# Patient Record
Sex: FEMALE | Race: WHITE | NOT HISPANIC OR LATINO | Employment: FULL TIME | URBAN - METROPOLITAN AREA
[De-identification: names, ages, dates, MRNs, and addresses within clinical notes are randomized per-mention and may not be internally consistent; named-entity substitution may affect disease eponyms.]

---

## 2020-07-02 ENCOUNTER — OFFICE VISIT (OUTPATIENT)
Dept: URGENT CARE | Facility: CLINIC | Age: 60
End: 2020-07-02
Payer: COMMERCIAL

## 2020-07-02 VITALS
SYSTOLIC BLOOD PRESSURE: 128 MMHG | HEIGHT: 66 IN | RESPIRATION RATE: 14 BRPM | WEIGHT: 214 LBS | BODY MASS INDEX: 34.39 KG/M2 | HEART RATE: 90 BPM | DIASTOLIC BLOOD PRESSURE: 80 MMHG | OXYGEN SATURATION: 95 % | TEMPERATURE: 98.5 F

## 2020-07-02 DIAGNOSIS — R05.8 DRY COUGH: ICD-10-CM

## 2020-07-02 DIAGNOSIS — Z88.9 HISTORY OF SEASONAL ALLERGIES: ICD-10-CM

## 2020-07-02 DIAGNOSIS — J30.9 ALLERGIC RHINITIS, UNSPECIFIED SEASONALITY, UNSPECIFIED TRIGGER: ICD-10-CM

## 2020-07-02 PROCEDURE — 99204 OFFICE O/P NEW MOD 45 MIN: CPT | Performed by: PHYSICIAN ASSISTANT

## 2020-07-02 RX ORDER — FLUTICASONE PROPIONATE 50 MCG
1 SPRAY, SUSPENSION (ML) NASAL DAILY
Qty: 16 G | Refills: 0 | Status: SHIPPED | OUTPATIENT
Start: 2020-07-02 | End: 2021-05-21

## 2020-07-02 RX ORDER — METHYLPREDNISOLONE 4 MG/1
TABLET ORAL
Qty: 1 PACKAGE | Refills: 0 | Status: SHIPPED | OUTPATIENT
Start: 2020-07-02 | End: 2020-12-28

## 2020-07-02 ASSESSMENT — ENCOUNTER SYMPTOMS
SHORTNESS OF BREATH: 0
RHINORRHEA: 1
DIARRHEA: 0
VOMITING: 0
SINUS PAIN: 0
EYE REDNESS: 1
SPUTUM PRODUCTION: 0
COUGH: 1
CHILLS: 0
ABDOMINAL PAIN: 0
SORE THROAT: 1
FEVER: 0
EYE DISCHARGE: 0
WHEEZING: 1
MYALGIAS: 0

## 2020-07-02 NOTE — PROGRESS NOTES
"Subjective:      Trey Fisher is a 59 y.o. female who presents with Cough (cough, sob x 10 days - states possible allergies, just moved to [place] )            Patient is a 59-year-old female who presents to urgent care with itchy eyes, sneezing, congestion, scratchy throat in the mornings and a slight dry cough for the last 9 to 10 days.  She does report episodes of \"coughing fits \"of which during that time she does feel slightly short of breath with questionable wheezing.  She does report long history of seasonal allergies in particular during springtime.  She reports recently moving to [place] during this time of which she feels that this is allergy related.  She does report mild improvement with Allegra however it does not appear to be helping as well as before. She denies \"feeling bad\" or ill at this time, fevers, bodyaches, GI changes. She also denies any ill contacts as well.       Cough   This is a new problem. The current episode started 1 to 4 weeks ago. The problem has been unchanged. The problem occurs every few minutes. The cough is non-productive. Associated symptoms include eye redness, nasal congestion, postnasal drip, rhinorrhea, a sore throat and wheezing. Pertinent negatives include no chills, fever, myalgias, rash or shortness of breath. The symptoms are aggravated by pollens (Outside work. ). Treatments tried: As above.  Her past medical history is significant for bronchitis and environmental allergies. There is no history of asthma or pneumonia.     Of note patient does report hx of stage I Leukemia- this is monitored with blood work every 3 months- she is currently not on any tx. For such. She otherwise reports she is healthy.           Review of Systems   Constitutional: Negative for chills and fever.   HENT: Positive for congestion, postnasal drip, rhinorrhea and sore throat. Negative for sinus pain.    Eyes: Positive for redness. Negative for discharge.   Respiratory: Positive for cough " "and wheezing. Negative for sputum production and shortness of breath.    Gastrointestinal: Negative for abdominal pain, diarrhea and vomiting.   Musculoskeletal: Negative for myalgias.   Skin: Negative for itching and rash.   Endo/Heme/Allergies: Positive for environmental allergies.   All other systems reviewed and are negative.         Objective:     /80 (BP Location: Left arm, Patient Position: Sitting, BP Cuff Size: Adult)   Pulse 90   Temp 36.9 °C (98.5 °F) (Temporal)   Resp 14   Ht 1.676 m (5' 6\")   Wt 97.1 kg (214 lb)   SpO2 95%   BMI 34.54 kg/m²    PMH:HC of Stage 1 leukemia.   MEDS: Reviewed .   ALLERGIES: No Known Allergies  SURGHX: History reviewed. No pertinent surgical history.  SOCHX:  reports that she has never smoked. She has never used smokeless tobacco.  FH: Family history was reviewed, no pertinent findings to report    Physical Exam  Vitals signs reviewed.   Constitutional:       General: She is not in acute distress.     Appearance: She is well-developed.   HENT:      Head: Normocephalic and atraumatic.      Right Ear: External ear normal.      Left Ear: External ear normal.      Mouth/Throat:      Pharynx: No oropharyngeal exudate.      Comments: Pos for PND.     Eyes:      Pupils: Pupils are equal, round, and reactive to light.      Comments: Mild conjunctival injection.    Neck:      Musculoskeletal: Normal range of motion and neck supple.      Trachea: No tracheal deviation.   Cardiovascular:      Rate and Rhythm: Normal rate and regular rhythm.      Heart sounds: No murmur.   Pulmonary:      Effort: Pulmonary effort is normal. No respiratory distress.      Breath sounds: Normal breath sounds.   Musculoskeletal: Normal range of motion.   Skin:     General: Skin is warm.      Findings: No rash.   Neurological:      Mental Status: She is alert and oriented to person, place, and time.      Coordination: Coordination normal.   Psychiatric:         Behavior: Behavior normal.         " Thought Content: Thought content normal.         Judgment: Judgment normal.                 Assessment/Plan:       1. Allergic rhinitis, unspecified seasonality, unspecified trigger  - fluticasone (FLONASE) 50 MCG/ACT nasal spray; Spray 1 Spray in nose every day.  Dispense: 16 g; Refill: 0  - methylPREDNISolone (MEDROL DOSEPAK) 4 MG Tablet Therapy Pack; UAD  Dispense: 1 Package; Refill: 0    2. History of seasonal allergies  - fluticasone (FLONASE) 50 MCG/ACT nasal spray; Spray 1 Spray in nose every day.  Dispense: 16 g; Refill: 0  - methylPREDNISolone (MEDROL DOSEPAK) 4 MG Tablet Therapy Pack; UAD  Dispense: 1 Package; Refill: 0    3. Dry cough  - methylPREDNISolone (MEDROL DOSEPAK) 4 MG Tablet Therapy Pack; UAD  Dispense: 1 Package; Refill: 0    Pt. Is to continue with the Allegra at this time- she is to take a night time Benadryl if needed.   Will start the patient in the above- she is to increase her fluid in take and avoid triggers.   Also avoid night time dairy.   Patient and/or guardian given precautionary s/sx that mandate immediate follow up and evaluation in the ED. Advised of risks of not doing so.  Side effects of the above medications discussed.   Pt without s/s of infection- however discussed symptoms that would prompt COVID testing at this time.     DDX, Supportive care, and indications for immediate follow-up discussed with patient.    Instructed to return to clinic or nearest emergency department if we are not available for any change in condition, further concerns, or worsening of symptoms.    The patient and/or guardian demonstrated a good understanding and agreed with the treatment plan.    Please note that this dictation was created using voice recognition software. I have made every reasonable attempt to correct obvious errors, but I expect that there are errors of grammar and possibly content that I did not discover before finalizing the note.

## 2020-07-15 ENCOUNTER — TELEMEDICINE (OUTPATIENT)
Dept: TELEHEALTH | Facility: TELEMEDICINE | Age: 60
End: 2020-07-15
Payer: COMMERCIAL

## 2020-07-15 DIAGNOSIS — J01.90 ACUTE BACTERIAL SINUSITIS: ICD-10-CM

## 2020-07-15 DIAGNOSIS — B96.89 ACUTE BACTERIAL SINUSITIS: ICD-10-CM

## 2020-07-15 DIAGNOSIS — R50.9 FEVER, UNSPECIFIED FEVER CAUSE: ICD-10-CM

## 2020-07-15 PROCEDURE — 99204 OFFICE O/P NEW MOD 45 MIN: CPT | Mod: 95,CR,CS | Performed by: NURSE PRACTITIONER

## 2020-07-15 RX ORDER — AMOXICILLIN AND CLAVULANATE POTASSIUM 875; 125 MG/1; MG/1
1 TABLET, FILM COATED ORAL 2 TIMES DAILY
Qty: 14 TAB | Refills: 0 | Status: SHIPPED | OUTPATIENT
Start: 2020-07-15 | End: 2020-07-22

## 2020-07-15 ASSESSMENT — ENCOUNTER SYMPTOMS
SWOLLEN GLANDS: 0
SINUS PRESSURE: 1
FEVER: 1
MEMORY LOSS: 0
MYALGIAS: 0
WHEEZING: 0
EYE DISCHARGE: 0
BACK PAIN: 0
WEAKNESS: 0
SINUS PAIN: 1
NAUSEA: 0
CONSTIPATION: 0
PALPITATIONS: 0
ORTHOPNEA: 0
HEARTBURN: 0
EYE PAIN: 0
DIARRHEA: 0
SORE THROAT: 1
DIAPHORESIS: 0
COUGH: 1
VOMITING: 0
NECK PAIN: 0
HEADACHES: 1
HEMOPTYSIS: 0
SHORTNESS OF BREATH: 0
TINGLING: 0
CHILLS: 0
DIZZINESS: 0

## 2020-07-21 ENCOUNTER — TELEPHONE (OUTPATIENT)
Dept: SCHEDULING | Facility: IMAGING CENTER | Age: 60
End: 2020-07-21

## 2020-07-28 ENCOUNTER — NURSE TRIAGE (OUTPATIENT)
Dept: HEALTH INFORMATION MANAGEMENT | Facility: OTHER | Age: 60
End: 2020-07-28

## 2020-07-28 ENCOUNTER — APPOINTMENT (OUTPATIENT)
Dept: MEDICAL GROUP | Facility: PHYSICIAN GROUP | Age: 60
End: 2020-07-28

## 2020-07-28 ENCOUNTER — HOSPITAL ENCOUNTER (OUTPATIENT)
Dept: LAB | Facility: MEDICAL CENTER | Age: 60
End: 2020-07-28
Attending: NURSE PRACTITIONER
Payer: COMMERCIAL

## 2020-07-28 DIAGNOSIS — R50.9 FEVER, UNSPECIFIED FEVER CAUSE: ICD-10-CM

## 2020-07-28 LAB — COVID ORDER STATUS COVID19: NORMAL

## 2020-07-28 PROCEDURE — C9803 HOPD COVID-19 SPEC COLLECT: HCPCS

## 2020-07-28 PROCEDURE — U0003 INFECTIOUS AGENT DETECTION BY NUCLEIC ACID (DNA OR RNA); SEVERE ACUTE RESPIRATORY SYNDROME CORONAVIRUS 2 (SARS-COV-2) (CORONAVIRUS DISEASE [COVID-19]), AMPLIFIED PROBE TECHNIQUE, MAKING USE OF HIGH THROUGHPUT TECHNOLOGIES AS DESCRIBED BY CMS-2020-01-R: HCPCS

## 2020-07-28 SDOH — ECONOMIC STABILITY: HOUSING INSECURITY: IN THE LAST 12 MONTHS, HOW MANY PLACES HAVE YOU LIVED?: 1

## 2020-07-28 SDOH — ECONOMIC STABILITY: HOUSING INSECURITY
IN THE LAST 12 MONTHS, WAS THERE A TIME WHEN YOU DID NOT HAVE A STEADY PLACE TO SLEEP OR SLEPT IN A SHELTER (INCLUDING NOW)?: NO

## 2020-07-28 SDOH — ECONOMIC STABILITY: TRANSPORTATION INSECURITY
IN THE PAST 12 MONTHS, HAS LACK OF RELIABLE TRANSPORTATION KEPT YOU FROM MEDICAL APPOINTMENTS, MEETINGS, WORK OR FROM GETTING THINGS NEEDED FOR DAILY LIVING?: DECLINE

## 2020-07-28 SDOH — HEALTH STABILITY: PHYSICAL HEALTH: ON AVERAGE, HOW MANY MINUTES DO YOU ENGAGE IN EXERCISE AT THIS LEVEL?: 30 MINUTES

## 2020-07-28 SDOH — ECONOMIC STABILITY: HOUSING INSECURITY
IN THE LAST 12 MONTHS, WAS THERE A TIME WHEN YOU DID NOT HAVE A STEADY PLACE TO SLEEP OR SLEPT IN A SHELTER (INCLUDING NOW)?: PATIENT REFUSED

## 2020-07-28 SDOH — HEALTH STABILITY: MENTAL HEALTH
STRESS IS WHEN SOMEONE FEELS TENSE, NERVOUS, ANXIOUS, OR CAN'T SLEEP AT NIGHT BECAUSE THEIR MIND IS TROUBLED. HOW STRESSED ARE YOU?: ONLY A LITTLE

## 2020-07-28 SDOH — HEALTH STABILITY: PHYSICAL HEALTH: ON AVERAGE, HOW MANY DAYS PER WEEK DO YOU ENGAGE IN MODERATE TO STRENUOUS EXERCISE (LIKE A BRISK WALK)?: 3 DAYS

## 2020-07-28 SDOH — ECONOMIC STABILITY: TRANSPORTATION INSECURITY
IN THE PAST 12 MONTHS, HAS THE LACK OF TRANSPORTATION KEPT YOU FROM MEDICAL APPOINTMENTS OR FROM GETTING MEDICATIONS?: NO

## 2020-07-28 SDOH — ECONOMIC STABILITY: INCOME INSECURITY: IN THE LAST 12 MONTHS, WAS THERE A TIME WHEN YOU WERE NOT ABLE TO PAY THE MORTGAGE OR RENT ON TIME?: PATIENT REFUSED

## 2020-07-28 ASSESSMENT — SOCIAL DETERMINANTS OF HEALTH (SDOH)
IN A TYPICAL WEEK, HOW MANY TIMES DO YOU TALK ON THE PHONE WITH FAMILY, FRIENDS, OR NEIGHBORS?: MORE THAN THREE TIMES A WEEK
HOW OFTEN DO YOU ATTENT MEETINGS OF THE CLUB OR ORGANIZATION YOU BELONG TO?: DECLINE
HOW HARD IS IT FOR YOU TO PAY FOR THE VERY BASICS LIKE FOOD, HOUSING, MEDICAL CARE, AND HEATING?: NOT VERY HARD
HOW MANY DRINKS CONTAINING ALCOHOL DO YOU HAVE ON A TYPICAL DAY WHEN YOU ARE DRINKING: 1 OR 2
HOW OFTEN DO YOU ATTEND CHURCH OR RELIGIOUS SERVICES?: DECLINE
HOW OFTEN DO YOU GET TOGETHER WITH FRIENDS OR RELATIVES?: ONCE A WEEK
HOW OFTEN DO YOU HAVE A DRINK CONTAINING ALCOHOL: MONTHLY OR LESS
WITHIN THE PAST 12 MONTHS, YOU WORRIED THAT YOUR FOOD WOULD RUN OUT BEFORE YOU GOT THE MONEY TO BUY MORE: NEVER TRUE
DO YOU BELONG TO ANY CLUBS OR ORGANIZATIONS SUCH AS CHURCH GROUPS UNIONS, FRATERNAL OR ATHLETIC GROUPS, OR SCHOOL GROUPS?: DECLINE
WITHIN THE PAST 12 MONTHS, THE FOOD YOU BOUGHT JUST DIDN'T LAST AND YOU DIDN'T HAVE MONEY TO GET MORE: NEVER TRUE
HOW OFTEN DO YOU HAVE SIX OR MORE DRINKS ON ONE OCCASION: NEVER

## 2020-07-28 NOTE — TELEPHONE ENCOUNTER
1. Caller Name: Trey                 Call Back Number: [phone]  [facility] PCP or Specialty Provider: No  None          2.  In the last two weeks, has the patient had any new or worsening symptoms (not explained by alternative diagnosis)? No.    3.  Does patient have any comoribidities? Immunosuppressed Leukemia history    4.  Has the patient traveled in the last 14 days OR had any known contact with someone who is suspected or confirmed to have COVID-19?  No.    5. Disposition: Cleared by RN Triage as potential is low for COVID-19; OK to keep/schedule appointment establishing care with new PCP.    Note routed to [facility] Provider: FYI only.      Lived in [place] from 5 weeks from [facility].  Symptoms of headache, sinus congestion and cough.  Reportedly allergy to new environment and elevation change.  No cough at this time.  No breathing problems. Headache is still present most days        Additional Information  • Negative: SEVERE difficulty breathing (e.g., struggling for each breath, speaks in single words)  • Negative: Difficult to awaken or acting confused (e.g., disoriented, slurred speech)  • Negative: Bluish (or gray) lips or face now  • Negative: Shock suspected (e.g., cold/pale/clammy skin, too weak to stand, low BP, rapid pulse)  • Negative: Sounds like a life-threatening emergency to the triager  • Negative: [1] COVID-19 suspected (e.g., cough, fever, shortness of breath) AND [2] public health department recommends testing  • Negative: [1] COVID-19 exposure AND [2] no symptoms  • Negative: COVID-19 and Breastfeeding, questions about  • Negative: SEVERE or constant chest pain (Exception: mild central chest pain, present only when coughing)  • Negative: MODERATE difficulty breathing (e.g., speaks in phrases, SOB even at rest, pulse 100-120)  • Negative: MILD difficulty breathing (e.g., minimal/no SOB at rest, SOB with walking, pulse <100)  • Negative: Chest pain  • Negative: Patient sounds very  "sick or weak to the triager  • Negative: Fever > 103 F (39.4 C)  • Negative: [1] Fever > 101 F (38.3 C) AND [2] age > 60  • Negative: [1] Fever > 100.0 F (37.8 C) AND [2] bedridden (e.g., nursing home patient, CVA, chronic illness, recovering from surgery)  • Negative: HIGH RISK patient (e.g., age > 64 years, diabetes, heart or lung disease, weak immune system)  • Negative: Fever present > 3 days (72 hours)  • Negative: [1] Fever returns after gone for over 24 hours AND [2] symptoms worse or not improved  • Negative: [1] Continuous (nonstop) coughing interferes with work or school AND [2] no improvement using cough treatment per protocol  • Negative: Cough present > 3 weeks  • Negative: [1] COVID-19 infection diagnosed or suspected AND [2] mild symptoms (fever, cough) AND [3] no trouble breathing or other complications  • Negative: COVID-19, questions about  • Negative: COVID-19 Home Isolation, questions about  • Negative: COVID-19 Prevention and Healthy Living, questions about  • Negative: COVID-19 Testing, questions about    Answer Assessment - Initial Assessment Questions  1. COVID-19 DIAGNOSIS: \"Who made your Coronavirus (COVID-19) diagnosis?\" \"Was it confirmed by a positive lab test?\" If not diagnosed by a HCP, ask \"Are there lots of cases (community spread) where you live?\" (See public health department website, if unsure)    * MAJOR community spread: high number of cases; numbers of cases are increasing; many people hospitalized.    * MINOR community spread: low number of cases; not increasing; few or no people hospitalized      no  2. ONSET: \"When did the COVID-19 symptoms start?\"       About 5 weeks ago when moving to [place]  3. WORST SYMPTOM: \"What is your worst symptom?\" (e.g., cough, fever, shortness of breath, muscle aches)      Persistent headache  4. COUGH: \"How bad is the cough?\"        No cough anymore  5. FEVER: \"Do you have a fever?\" If so, ask: \"What is your temperature, how was it measured, and " "when did it start?\"      no  6. RESPIRATORY STATUS: \"Describe your breathing?\" (e.g., shortness of breath, wheezing, unable to speak)       no  7. BETTER-SAME-WORSE: \"Are you getting better, staying the same or getting worse compared to yesterday?\"  If getting worse, ask, \"In what way?\"      bettr  8. HIGH RISK DISEASE: \"Do you have any chronic medical problems?\" (e.g., asthma, heart or lung disease, weak immune system, etc.)      Leukemia  9. PREGNANCY: \"Is there any chance you are pregnant?\" \"When was your last menstrual period?\"      no  10. OTHER SYMPTOMS: \"Do you have any other symptoms?\"  (e.g., runny nose, headache, sore throat, loss of smell)  Headache only    Protocols used: CORONAVIRUS (COVID-19) DIAGNOSED OR [id number]      "

## 2020-07-28 NOTE — TELEPHONE ENCOUNTER
Regarding: ACTIVE COVID-19 SYMPTOMS  ----- Message from Lorraine Sneed sent at 7/28/2020  3:00 PM PDT -----  ACTIVE COVID-19 SYMPTOMS. WAS SUGGESTED TO GET TESTED BY [facility] BUT NEVER WENT

## 2020-07-29 LAB
SARS-COV-2 RNA RESP QL NAA+PROBE: NOTDETECTED
SPECIMEN SOURCE: NORMAL

## 2020-07-31 NOTE — RESULT ENCOUNTER NOTE
Unable to reach pt. Mala was left with call back number if she has any questions about her results.

## 2020-08-31 ENCOUNTER — NURSE TRIAGE (OUTPATIENT)
Dept: HEALTH INFORMATION MANAGEMENT | Facility: OTHER | Age: 60
End: 2020-08-31

## 2020-08-31 ENCOUNTER — TELEPHONE (OUTPATIENT)
Dept: HEALTH INFORMATION MANAGEMENT | Facility: OTHER | Age: 60
End: 2020-08-31

## 2020-08-31 NOTE — TELEPHONE ENCOUNTER
1. Caller Name: Trey                 Call Back Number: [phone]  [facility] PCP or Specialty Provider: No  Would like to establish care.        2.  In the last two weeks, has the patient had any new or worsening symptoms (not explained by alternative diagnosis)? No.  Allergy  3.  Does patient have any comoribidities? Immunosuppressed Chronic lymphocytic anemia stage 1    4.  Has the patient traveled in the last 14 days OR had any known contact with someone who is suspected or confirmed to have COVID-19?  No.    5. Disposition: Cleared by RN Triage as potential is low for COVID-19; OK to keep/schedule appointment     Scheduled NP appointment and UC visit for patient due to continued sinus and allergy symptoms    Note routed to [facility] Provider: JOCYE only.   Moved to [place] 6 ot 7 weeks ago and started with coughing , wheezing and headaches.  She then went to the UC who diagnosed with allergy or sinus infection.  Symptoms improved but then Sunday started with itchy watery eyes, scratchy cough.    Reason for Disposition  • [1] Sinus congestion (pressure, fullness) AND [2] present > 10 days    Additional Information  • Negative: Severe difficulty breathing (e.g., struggling for each breath, speaks in single words)  • Negative: Sounds like a life-threatening emergency to the triager  • Negative: [1] Sinus infection AND [2] taking an antibiotic AND [3] symptoms continue  • Negative: [1] Difficulty breathing AND [2] not from stuffy nose (e.g., not relieved by cleaning out the nose)  • Negative: [1] SEVERE headache AND [2] fever  • Negative: [1] Redness or swelling on the cheek, forehead or around the eye AND [2] fever  • Negative: Fever > 104 F (40 C)  • Negative: Patient sounds very sick or weak to the triager  • Negative: [1] SEVERE pain AND [2] not improved 2 hours after pain medicine  • Negative: [1] Redness or swelling on the cheek, forehead or around the eye AND [2] no fever  • Negative: [1] Fever > 101 F (38.3 C) AND  "[2] age > 60  • Negative: [1] Fever > 100.0 F (37.8 C) AND [2] bedridden (e.g., nursing home patient, CVA, chronic illness, recovering from surgery)  • Negative: [1] Fever > 100.0 F (37.8 C) AND [2] diabetes mellitus or weak immune system (e.g., HIV positive, cancer chemo, splenectomy, organ transplant, chronic steroids)  • Negative: Fever present > 3 days (72 hours)  • Negative: [1] Fever returns after gone for over 24 hours AND [2] symptoms worse or not improved  • Negative: [1] Sinus pain (not just congestion) AND [2] fever  • Negative: Earache    Answer Assessment - Initial Assessment Questions  1. LOCATION: \"Where does it hurt?\"       No pain  2. ONSET: \"When did the sinus pain start?\"  (e.g., hours, days)       Since moving to [place] about 6 or 7 weeks ag.  3. SEVERITY: \"How bad is the pain?\"   (Scale 1-10; mild, moderate or severe)    - MILD (1-3): doesn't interfere with normal activities     - MODERATE (4-7): interferes with normal activities (e.g., work or school) or awakens from sleep    - SEVERE (8-10): excruciating pain and patient unable to do any normal activities         moderated  4. RECURRENT SYMPTOM: \"Have you ever had sinus problems before?\" If so, ask: \"When was the last time?\" and \"What happened that time?\"       Yes.  Went to  about 6 weeks ago for symptoms  5. NASAL CONGESTION: \"Is the nose blocked?\" If so, ask, \"Can you open it or must you breathe through the mouth?\"      yes  6. NASAL DISCHARGE: \"Do you have discharge from your nose?\" If so ask, \"What color?\"      unknown  7. FEVER: \"Do you have a fever?\" If so, ask: \"What is it, how was it measured, and when did it start?\"       no  8. OTHER SYMPTOMS: \"Do you have any other symptoms?\" (e.g., sore throat, cough, earache, difficulty breathing)      Scratchy cough  9. PREGNANCY: \"Is there any chance you are pregnant?\" \"When was your last menstrual period?\"      no    Protocols used: SINUS PAIN OR CONGESTION-A-AH      "

## 2020-08-31 NOTE — TELEPHONE ENCOUNTER
Regarding: Cough and scratchy throat  ----- Message from Cindy Arellano sent at 8/31/2020 11:02 AM PDT -----  Patient is calling to establish care with Dr. Melgoza, she is having a on and off cough and a scratchy throat.

## 2020-09-01 ENCOUNTER — OFFICE VISIT (OUTPATIENT)
Dept: URGENT CARE | Facility: CLINIC | Age: 60
End: 2020-09-01
Payer: COMMERCIAL

## 2020-09-01 ENCOUNTER — HOSPITAL ENCOUNTER (OUTPATIENT)
Facility: MEDICAL CENTER | Age: 60
End: 2020-09-01
Attending: FAMILY MEDICINE
Payer: COMMERCIAL

## 2020-09-01 VITALS
RESPIRATION RATE: 16 BRPM | HEART RATE: 88 BPM | SYSTOLIC BLOOD PRESSURE: 108 MMHG | OXYGEN SATURATION: 96 % | BODY MASS INDEX: 33.91 KG/M2 | TEMPERATURE: 97.8 F | WEIGHT: 211 LBS | HEIGHT: 66 IN | DIASTOLIC BLOOD PRESSURE: 70 MMHG

## 2020-09-01 DIAGNOSIS — R09.82 PND (POST-NASAL DRIP): ICD-10-CM

## 2020-09-01 LAB — COVID ORDER STATUS COVID19: NORMAL

## 2020-09-01 PROCEDURE — U0003 INFECTIOUS AGENT DETECTION BY NUCLEIC ACID (DNA OR RNA); SEVERE ACUTE RESPIRATORY SYNDROME CORONAVIRUS 2 (SARS-COV-2) (CORONAVIRUS DISEASE [COVID-19]), AMPLIFIED PROBE TECHNIQUE, MAKING USE OF HIGH THROUGHPUT TECHNOLOGIES AS DESCRIBED BY CMS-2020-01-R: HCPCS

## 2020-09-01 PROCEDURE — 99214 OFFICE O/P EST MOD 30 MIN: CPT | Performed by: FAMILY MEDICINE

## 2020-09-01 RX ORDER — ZOLPIDEM TARTRATE 5 MG/1
5 TABLET ORAL NIGHTLY PRN
COMMUNITY
End: 2021-01-07

## 2020-09-01 RX ORDER — BENZONATATE 200 MG/1
200 CAPSULE ORAL 3 TIMES DAILY PRN
Qty: 45 CAP | Refills: 0 | Status: SHIPPED | OUTPATIENT
Start: 2020-09-01 | End: 2021-01-07

## 2020-09-01 RX ORDER — FLUOXETINE HYDROCHLORIDE 20 MG/1
20 CAPSULE ORAL
COMMUNITY
Start: 2019-03-10 | End: 2021-04-22

## 2020-09-01 RX ORDER — ESTRADIOL 0.5 MG/.5G
GEL TOPICAL
COMMUNITY
Start: 2014-01-01 | End: 2021-01-07

## 2020-09-01 RX ORDER — CODEINE PHOSPHATE AND GUAIFENESIN 10; 100 MG/5ML; MG/5ML
5 SOLUTION ORAL EVERY 6 HOURS PRN
Qty: 75 ML | Refills: 0 | Status: SHIPPED | OUTPATIENT
Start: 2020-09-01 | End: 2020-09-08

## 2020-09-01 NOTE — PROGRESS NOTES
Chief Complaint   Patient presents with   • Cough     x 3-4 days, dry cough, scrachy throat, watery eyes         Congestion, cough  This is a new problem. The current episode started 3 d ago. The problem has been unchanged.  The cough is dry.  The problem occurs constantly , but worse at night.  Associated symptoms include : runny nose, scratchy throat.  Pertinent negatives include no  Fevers, nausea, vomiting, diarrhea, sweats, weight loss or wheezing. Nothing aggravates the symptoms.  Patient has tried nothing for the symptoms. There is no history of asthma.        Social History     Tobacco Use   • Smoking status: Never Smoker   • Smokeless tobacco: Never Used   Substance Use Topics   • Alcohol Use     Frequency: Monthly or less     Drinks per session: 1 or 2     Binge frequency: Never   • Drug use: Not on file            past med hx:   CLL, stage I    Current Outpatient Medications on File Prior to Visit   Medication Sig Dispense Refill   • FLUoxetine (PROZAC) 20 MG Cap Take 20 mg by mouth.     • zolpidem (AMBIEN) 5 MG Tab Take 5 mg by mouth at bedtime as needed for Sleep.     • Estradiol 0.5 MG/0.5GM Gel      • PROAIR  (90 Base) MCG/ACT Aero Soln inhalation aerosol Inhale 2 Puffs by mouth every four hours as needed for Shortness of Breath. 90 g 0   • fluticasone (FLONASE) 50 MCG/ACT nasal spray Spray 1 Spray in nose every day. (Patient not taking: Reported on 9/1/2020) 16 g 0   • methylPREDNISolone (MEDROL DOSEPAK) 4 MG Tablet Therapy Pack UAD (Patient not taking: Reported on 9/1/2020) 1 Package 0     No current facility-administered medications on file prior to visit.          Review of Systems   Constitutional: Negative for fever and weight loss.   HENT: negative for otalgia  Cardiovascular - denies chest pain or dyspnea  Respiratory: Positive for cough.  .  Negative for wheezing.    Neurological: Negative for headaches.   GI - denies nausea, vomiting or diarrhea  Neuro - denies numbness or tingling.  "           Objective:     /70 (BP Location: Left arm, Patient Position: Sitting, BP Cuff Size: Adult)   Pulse 88   Temp 36.6 °C (97.8 °F) (Temporal)   Resp 16   Ht 1.676 m (5' 6\")   Wt 95.7 kg (211 lb)   SpO2 96%       Physical Exam   Constitutional: patient is oriented to person, place, and time. Patient appears well-developed and well-nourished. No distress.   HENT:   Head: Normocephalic and atraumatic.   Right Ear: External ear normal.   Left Ear: External ear normal.   Nose: Mucosal edema and clear postnasal drip present. Right sinus exhibits no maxillary sinus tenderness. Left sinus exhibits no maxillary sinus tenderness. Tonsils are absent  Mouth/Throat: Mucous membranes are normal. No oral lesions.  No posterior pharyngeal erythema.  No oropharyngeal exudate or posterior oropharyngeal edema.   Eyes: Conjunctivae and EOM are normal. Pupils are equal, round, and reactive to light. Right eye exhibits no discharge. Left eye exhibits no discharge. No scleral icterus.   Neck: Normal range of motion. Neck supple. No tracheal deviation present.   Cardiovascular: Normal rate, regular rhythm and normal heart sounds.  Exam reveals no friction rub.    Pulmonary/Chest: Effort normal. No respiratory distress. Patient has no wheezes or rhonchi. Patient has no rales.    Musculoskeletal:  exhibits no edema.   Lymphadenopathy:     Patient has no cervical adenopathy.      Neurological: patient is alert and oriented to person, place, and time.   Skin: Skin is warm and dry. No rash noted. No erythema.   Psychiatric: patient  has a normal mood and affect.  behavior is normal.   Nursing note and vitals reviewed.              Assessment/Plan:       1. Postnasal drip        - fluticasone (FLONASE) 50 MCG/ACT nasal spray; Spray 1 Spray in nose 2 times a day.  Dispense: 1 Bottle; Refill: 0        COVID screen sent  Home isolation for now  Will call with results.     Follow up in one week if no improvement, sooner if " symptoms worsen.           - COVID/SARS COV-2 PCR; Future

## 2020-09-02 LAB
SARS-COV-2 RNA RESP QL NAA+PROBE: NOTDETECTED
SPECIMEN SOURCE: NORMAL

## 2020-09-03 ENCOUNTER — TELEPHONE (OUTPATIENT)
Dept: URGENT CARE | Facility: CLINIC | Age: 60
End: 2020-09-03

## 2020-10-27 ENCOUNTER — HOSPITAL ENCOUNTER (OUTPATIENT)
Dept: RADIOLOGY | Facility: MEDICAL CENTER | Age: 60
End: 2020-10-27

## 2020-11-12 ENCOUNTER — TELEPHONE (OUTPATIENT)
Dept: SCHEDULING | Facility: IMAGING CENTER | Age: 60
End: 2020-11-12

## 2020-11-16 ENCOUNTER — APPOINTMENT (OUTPATIENT)
Dept: RADIOLOGY | Facility: MEDICAL CENTER | Age: 60
End: 2020-11-16
Payer: COMMERCIAL

## 2020-11-16 DIAGNOSIS — Z12.31 VISIT FOR SCREENING MAMMOGRAM: ICD-10-CM

## 2020-12-18 ENCOUNTER — TELEPHONE (OUTPATIENT)
Dept: SCHEDULING | Facility: IMAGING CENTER | Age: 60
End: 2020-12-18

## 2020-12-28 ENCOUNTER — TELEMEDICINE (OUTPATIENT)
Dept: MEDICAL GROUP | Facility: MEDICAL CENTER | Age: 60
End: 2020-12-28
Payer: COMMERCIAL

## 2020-12-28 VITALS — WEIGHT: 211 LBS | HEIGHT: 66 IN | BODY MASS INDEX: 33.91 KG/M2

## 2020-12-28 DIAGNOSIS — R51.9 HEADACHE DUE TO VIRAL INFECTION: ICD-10-CM

## 2020-12-28 DIAGNOSIS — B34.9 HEADACHE DUE TO VIRAL INFECTION: ICD-10-CM

## 2020-12-28 DIAGNOSIS — Z20.822 FEVER WITH EXPOSURE TO COVID-19 VIRUS: ICD-10-CM

## 2020-12-28 DIAGNOSIS — C91.10 CHRONIC LYMPHOCYTIC LEUKEMIA (HCC): ICD-10-CM

## 2020-12-28 DIAGNOSIS — R50.9 FEVER WITH EXPOSURE TO COVID-19 VIRUS: ICD-10-CM

## 2020-12-28 DIAGNOSIS — R09.81 NASAL CONGESTION: ICD-10-CM

## 2020-12-28 DIAGNOSIS — U07.1 LAB TEST POSITIVE FOR DETECTION OF COVID-19 VIRUS: ICD-10-CM

## 2020-12-28 PROBLEM — E79.0 HYPERURICEMIA W/O SIGNS OF INFLAM ARTHRIT AND TOPHACEOUS DIS: Status: ACTIVE | Noted: 2018-10-30

## 2020-12-28 PROCEDURE — 99213 OFFICE O/P EST LOW 20 MIN: CPT | Mod: CS,95,CR | Performed by: FAMILY MEDICINE

## 2020-12-28 RX ORDER — GABAPENTIN 300 MG/1
CAPSULE ORAL
COMMUNITY
Start: 2020-11-18 | End: 2021-05-21

## 2020-12-28 RX ORDER — GABAPENTIN 100 MG/1
CAPSULE ORAL
COMMUNITY
Start: 2020-11-18 | End: 2021-05-21

## 2020-12-28 RX ORDER — LORAZEPAM 0.5 MG/1
0.5 TABLET ORAL
COMMUNITY
Start: 2020-12-11 | End: 2021-01-07 | Stop reason: SDUPTHER

## 2020-12-28 RX ORDER — TRIAMTERENE AND HYDROCHLOROTHIAZIDE 75; 50 MG/1; MG/1
1 TABLET ORAL
COMMUNITY
Start: 2015-07-01 | End: 2022-09-23 | Stop reason: SDUPTHER

## 2020-12-28 ASSESSMENT — PATIENT HEALTH QUESTIONNAIRE - PHQ9: CLINICAL INTERPRETATION OF PHQ2 SCORE: 0

## 2020-12-28 NOTE — PROGRESS NOTES
Virtual Visit: Established Patient   This visit was conducted via Zoom  using secure and encrypted videoconferencing technology. The patient was in a private location in the state of [place].    The patient's identity was confirmed and verbal consent was obtained for this virtual visit.      CC: COVID-19 positivity with symptoms.  Patient has not yet established but will be establishing with me in February.                                                                                                                                  HPI:   Trey presents today with the following.    1. Lab test positive for detection of COVID-19 virus  Patient acquired a home test kit and tested 12/23/2020 due to severe headache and moderate fever.  Did not get results until this weekend.  Congested, headache, feverish.  Denies significant SOB.  Fever up to 102.4.  Appetite somewhat reduced.    2. Headache due to viral infection  Patient states this began as a very significant headache on 22 December.  Denies visual changes.  Denies weakness.    3. Nasal congestion  Patient does sound somewhat congested.  There is very little cough.  No wheezing appreciated.  Respirations appear easy.  Denies significant shortness of breath.    4. Fever with exposure to COVID-19 virus  Patient has had fever up to 102.4.  Denies disorientation.    5. Chronic lymphocytic leukemia (HCC)  She has increased concern which is appropriate as she has underlying CLL.  She is doing fairly well with the COVID-19 virus at this point.  However, I have stressed that if she becomes more short of breath or has more concerning symptoms she should not hesitate to go to ER.      Patient Active Problem List    Diagnosis Date Noted   • Lab test positive for detection of COVID-19 virus 12/23/2020   • Chronic lymphocytic leukemia (HCC) 10/30/2018   • Hyperuricemia w/o signs of inflam arthrit and tophaceous dis 10/30/2018       Current Outpatient Medications   Medication  "Sig Dispense Refill   • triamterene-hydrochlorothiazide (MAXZIDE 75-50) 75-50 MG Tab Take 1 Tab by mouth.     • vitamin D (VITAMIND D3) 1000 UNIT Tab Take 1,000 Units by mouth every day.     • gabapentin (NEURONTIN) 100 MG Cap TK 1 C PO TID PRN     • gabapentin (NEURONTIN) 300 MG Cap TK 1 C PO TID PRN     • LORazepam (ATIVAN) 0.5 MG Tab Take 0.5 mg by mouth 1 time a day as needed.     • FLUoxetine (PROZAC) 20 MG Cap Take 20 mg by mouth.     • zolpidem (AMBIEN) 5 MG Tab Take 5 mg by mouth at bedtime as needed for Sleep.     • Estradiol 0.5 MG/0.5GM Gel      • benzonatate (TESSALON) 200 MG capsule Take 1 Cap by mouth 3 times a day as needed for Cough. 45 Cap 0   • PROAIR  (90 Base) MCG/ACT Aero Soln inhalation aerosol Inhale 2 Puffs by mouth every four hours as needed for Shortness of Breath. 90 g 0   • fluticasone (FLONASE) 50 MCG/ACT nasal spray Spray 1 Spray in nose every day. (Patient not taking: Reported on 9/1/2020) 16 g 0   • methylPREDNISolone (MEDROL DOSEPAK) 4 MG Tablet Therapy Pack UAD (Patient not taking: Reported on 9/1/2020) 1 Package 0     No current facility-administered medications for this visit.          Allergies as of 12/28/2020   • (No Known Allergies)        ROS:  Denies, chest pain, Shortness of breath, Edema.  Please see details above.  Patient does have fever.    Ht 1.676 m (5' 6\")   Wt 95.7 kg (211 lb)   BMI 34.06 kg/m²       Physical Exam:  Constitutional: Alert, no distress, well-groomed.  Skin: No rashes in visible areas.  Eye: Round. Conjunctiva clear, No icterus.   ENMT: Lips pink without lesions, good dentition, moist mucous membranes. Phonation normal.  Voice does sound slightly nasal.  Neck: No masses, no thyromegaly. Moves freely without pain.  Respiratory: Unlabored respiratory effort, small amount of cough or audible wheeze.  No retractions or struggling to breathe observed.  Psych: Alert and oriented x3, normal affect and mood.          Assessment and Plan.   60 y.o. " female with the following issues.    1. Lab test positive for detection of COVID-19 virus  Home test positive, moderate symptoms    2. Headache due to viral infection  Strong headache without visual changes or weakness, likely due to the COVID-19    3. Nasal congestion  Moderate congestion, denies any shortness of breath    4. Fever with exposure to COVID-19 virus  Moderate fever without disorientation.    5. Chronic lymphocytic leukemia (HCC)  Patient does have CLL and has been stable.  She is establishing in this area and will see me February 3.  She will let me know if her symptoms worsen in the interim.    Follow up 2/3/2021, sooner as needed

## 2021-01-07 ENCOUNTER — TELEMEDICINE (OUTPATIENT)
Dept: MEDICAL GROUP | Facility: MEDICAL CENTER | Age: 61
End: 2021-01-07
Payer: COMMERCIAL

## 2021-01-07 VITALS — BODY MASS INDEX: 30.7 KG/M2 | HEIGHT: 66 IN | WEIGHT: 191 LBS

## 2021-01-07 DIAGNOSIS — R05.8 PRODUCTIVE COUGH: ICD-10-CM

## 2021-01-07 DIAGNOSIS — F41.8 SITUATIONAL ANXIETY: ICD-10-CM

## 2021-01-07 DIAGNOSIS — R05.3 COUGH, PERSISTENT: ICD-10-CM

## 2021-01-07 DIAGNOSIS — G47.01 INSOMNIA DUE TO MEDICAL CONDITION: ICD-10-CM

## 2021-01-07 DIAGNOSIS — E55.9 VITAMIN D DEFICIENCY: ICD-10-CM

## 2021-01-07 DIAGNOSIS — Z86.16 HISTORY OF 2019 NOVEL CORONAVIRUS DISEASE (COVID-19): ICD-10-CM

## 2021-01-07 PROCEDURE — 99213 OFFICE O/P EST LOW 20 MIN: CPT | Mod: 95,CR | Performed by: FAMILY MEDICINE

## 2021-01-07 RX ORDER — LORAZEPAM 0.5 MG/1
0.5 TABLET ORAL
Qty: 30 TAB | Refills: 0 | Status: SHIPPED | OUTPATIENT
Start: 2021-01-07 | End: 2021-02-05

## 2021-01-07 RX ORDER — ZOLPIDEM TARTRATE 5 MG/1
5 TABLET ORAL NIGHTLY PRN
Qty: 30 TAB | Refills: 0 | Status: SHIPPED | OUTPATIENT
Start: 2021-01-07 | End: 2021-02-05

## 2021-01-07 RX ORDER — AZITHROMYCIN 250 MG/1
TABLET, FILM COATED ORAL
Qty: 6 TAB | Refills: 0 | Status: SHIPPED | OUTPATIENT
Start: 2021-01-07 | End: 2021-04-22

## 2021-01-07 RX ORDER — ERGOCALCIFEROL 1.25 MG/1
50000 CAPSULE ORAL
Qty: 4 CAP | Refills: 11 | Status: SHIPPED | OUTPATIENT
Start: 2021-01-07 | End: 2021-12-03

## 2021-01-07 ASSESSMENT — PATIENT HEALTH QUESTIONNAIRE - PHQ9
5. POOR APPETITE OR OVEREATING: 0 - NOT AT ALL
SUM OF ALL RESPONSES TO PHQ QUESTIONS 1-9: 10
CLINICAL INTERPRETATION OF PHQ2 SCORE: 4

## 2021-01-07 NOTE — PROGRESS NOTES
Virtual Visit: Established Patient   This visit was conducted via Zoom  using secure and encrypted videoconferencing technology. The patient was in a private location in the [facility].    The patient's identity was confirmed and verbal consent was obtained for this virtual visit.  Patient and I were able to hear and see each other throughout the visit.      CC:   Infection with COVID-19 virus, cough, anxiety and insomnia                                                                                                                                   HPI:   Trey presents today with the following.    1. History of 2019 novel coronavirus disease (COVID-19)  Patient is dealing with her COVID-19 symptoms.  Most recent positive test January 1  Having cough and congestion.  Not a lot of shortness of breath.  Denies significant change in taste or smell.  The primary issue for her at this time is the cough.    2. Cough, persistent/Productive cough  She only had a few days of shortness of breath.  At this point the problem for her is the persistent productive cough and she is noting discoloration of the phlegm.  Patient did try benzonatate in the past with no improvement.  Patient states she does have a tendency towards bacterial bronchitis after viral infection.  Discussed use of his Z-Tank.    4. Insomnia due to medical condition  Patient states the Ambien has been very helpful for as needed use.  She has needed it more during this infection as without it she cannot seem to sleep.  Discussed that the body gets used to this medication very quickly and she should use as little as possible as infrequently as possible.  She voices understanding.  PDMP review shows no inconsistencies.    5. Situational anxiety  She had severe situational anxiety and difficulty coping with anxiety during the terminal illness of her  and his death.  She feels she is doing better, he has passed away about a year ago.  However, she still  does occasionally use lorazepam as needed when the anxiety is too great.  PDMP is reviewed which shows episodic fills only.  The prescription is renewed.    6. Vitamin D deficiency  Patient has history of vitamin D deficiency.  She has been on ergocalciferol for this for a while.  She and her previous provider did try to stop it at one point and her vitamin D level did drop.  - ergocalciferol (DRISDOL) 26467 UNIT capsule; Take 1 Cap by mouth every 7 days.  Dispense: 4 Cap; Refill: 11      Patient Active Problem List    Diagnosis Date Noted   • Lab test positive for detection of COVID-19 virus 12/23/2020   • Chronic lymphocytic leukemia (HCC) 10/30/2018   • Hyperuricemia w/o signs of inflam arthrit and tophaceous dis 10/30/2018       Current Outpatient Medications   Medication Sig Dispense Refill   • zolpidem (AMBIEN) 5 MG Tab Take 1 Tab by mouth at bedtime as needed for Sleep for up to 30 days. 30 Tab 0   • LORazepam (ATIVAN) 0.5 MG Tab Take 1 Tab by mouth 1 time a day as needed for Anxiety for up to 30 days. 30 Tab 0   • azithromycin (ZITHROMAX) 250 MG Tab As directed on pack 6 Tab 0   • vitamin D (VITAMIND D3) 1000 UNIT Tab Take 1,000 Units by mouth every day.     • gabapentin (NEURONTIN) 100 MG Cap TK 1 C PO TID PRN     • gabapentin (NEURONTIN) 300 MG Cap TK 1 C PO TID PRN     • triamterene-hydrochlorothiazide (MAXZIDE 75-50) 75-50 MG Tab Take 1 Tab by mouth.     • FLUoxetine (PROZAC) 20 MG Cap Take 20 mg by mouth.     • Estradiol 0.5 MG/0.5GM Gel      • benzonatate (TESSALON) 200 MG capsule Take 1 Cap by mouth 3 times a day as needed for Cough. 45 Cap 0   • PROAIR  (90 Base) MCG/ACT Aero Soln inhalation aerosol Inhale 2 Puffs by mouth every four hours as needed for Shortness of Breath. 90 g 0   • fluticasone (FLONASE) 50 MCG/ACT nasal spray Spray 1 Spray in nose every day. 16 g 0     No current facility-administered medications for this visit.          Allergies as of 01/07/2021   • (No Known Allergies)  "       ROS:  Denies, chest pain, has mild exertional shortness of breath, denies edema.  Covid symptoms as described above.    Ht 1.676 m (5' 6\")   Wt 86.6 kg (191 lb)   BMI 30.83 kg/m²       Physical Exam:  Constitutional: Alert, no distress, well-groomed.  Skin: No rashes in visible areas.  Eye: Round. Conjunctiva clear, No icterus.   ENMT: Lips pink without lesions, good dentition, moist mucous membranes. Phonation normal.  Neck: No masses, no thyromegaly. Moves freely without pain.  Respiratory: Significant cough is appreciated.  She can speak in full sentences but there is some wheezing and is sometimes interrupted by cough.  Her voice does sound congested and somewhat nasal.  Psych: Alert and oriented x3, normal affect and mood.          Assessment and Plan.   60 y.o. female with the following issues.    1. History of 2019 novel coronavirus disease (COVID-19)  Patient feels she is gradually improving.  She is hoping to be able to resume work next week.  She is still quite tired.  However, she does work from home so she feels that in a few days she should be able to do this.  She will keep me up-to-date.    2. Cough, persistent  Patient is trying Robitussin-DM.  Feels this is working somewhat at this point.  She is just concerned because the cough persists and seems to be worsening.  Denies worsening shortness of breath.    3. Productive cough  Patient has a tendency towards secondary bronchitis.  A Z-Tank is discussed and prescribed.  Not having significant shortness of breath.  No current need for dexamethasone appreciated.  - azithromycin (ZITHROMAX) 250 MG Tab; As directed on pack  Dispense: 6 Tab; Refill: 0    4. Insomnia due to medical condition  Medication is renewed.  She continues to take this episodically.  PDMP review shows no inconsistencies.  Denies sleepwalking or sleep driving or aberrant behaviors.  - zolpidem (AMBIEN) 5 MG Tab; Take 1 Tab by mouth at bedtime as needed for Sleep for up to 30 " days.  Dispense: 30 Tab; Refill: 0    5. Situational anxiety  The medication has been taken sporadically, PDMP review shows no inconsistencies.  This is renewed.  - LORazepam (ATIVAN) 0.5 MG Tab; Take 1 Tab by mouth 1 time a day as needed for Anxiety for up to 30 days.  Dispense: 30 Tab; Refill: 0    6. Vitamin D deficiency  The medication is renewed.  We will check her vitamin D level again in the spring when she comes in to establish care in person.  - ergocalciferol (DRISDOL) 18306 UNIT capsule; Take 1 Cap by mouth every 7 days.  Dispense: 4 Cap; Refill: 11    Recheck April, sooner as needed

## 2021-01-18 DIAGNOSIS — R05.3 PERSISTENT COUGH FOR 3 WEEKS OR LONGER: ICD-10-CM

## 2021-01-18 RX ORDER — PROMETHAZINE HYDROCHLORIDE 6.25 MG/5ML
6.25 SYRUP ORAL EVERY 4 HOURS PRN
Qty: 240 ML | Refills: 0 | Status: SHIPPED | OUTPATIENT
Start: 2021-01-18 | End: 2021-01-18

## 2021-01-18 RX ORDER — PROMETHAZINE HYDROCHLORIDE 6.25 MG/5ML
SYRUP ORAL
Qty: 180 ML | Refills: 2 | Status: SHIPPED | OUTPATIENT
Start: 2021-01-18 | End: 2021-04-22

## 2021-01-21 ENCOUNTER — TELEMEDICINE (OUTPATIENT)
Dept: MEDICAL GROUP | Facility: MEDICAL CENTER | Age: 61
End: 2021-01-21
Payer: COMMERCIAL

## 2021-01-21 VITALS — HEIGHT: 66 IN | BODY MASS INDEX: 30.7 KG/M2 | WEIGHT: 191 LBS

## 2021-01-21 DIAGNOSIS — Z86.16 HISTORY OF 2019 NOVEL CORONAVIRUS DISEASE (COVID-19): ICD-10-CM

## 2021-01-21 DIAGNOSIS — R50.81 FEVER IN OTHER DISEASES: ICD-10-CM

## 2021-01-21 DIAGNOSIS — G44.89 OTHER HEADACHE SYNDROME: ICD-10-CM

## 2021-01-21 DIAGNOSIS — R05.9 COUGH: ICD-10-CM

## 2021-01-21 PROCEDURE — 99213 OFFICE O/P EST LOW 20 MIN: CPT | Mod: 95,CR | Performed by: FAMILY MEDICINE

## 2021-01-21 RX ORDER — CEFUROXIME AXETIL 500 MG/1
500 TABLET ORAL 2 TIMES DAILY
Qty: 14 TAB | Refills: 0 | Status: SHIPPED | OUTPATIENT
Start: 2021-01-21 | End: 2021-01-28

## 2021-01-21 NOTE — PROGRESS NOTES
Virtual Visit: Established Patient   This visit was conducted via Zoom  using secure and encrypted videoconferencing technology. The patient was in a private location in the state of [place].    The patient's identity was confirmed and verbal consent was obtained for this virtual visit.      CC: History of COVID-19 recently, cough, headache, low-grade fever                                                                                                                                   HPI:   Trey presents today with the following.    1. History of 2019 novel coronavirus disease (COVID-19)  Patient did have COVID-19 a few months ago.  She states this currently feels quite different.  However, she did get a Covid test which she performed earlier today and will get results tomorrow.    2. Cough  Patient is having significant cough.  She feels this is quite different from the Covid.  States this feels more like sinus infection or bronchitis cough.  She has had both in the past.  Patient has CLL and is more at risk for bacterial infection.  Denies SOB.  Has some when she coughs repeatedly.  Some discoloration of phlegm.  Due to the underlying leukemia I have elected to treat her with broad-spectrum antibiotics.    3. Other headache syndrome  She is getting an unusual headache.  Denies any change in vision.  She will keep me updated.    4. Fever in other diseases  Patient is getting low-grade fever .  This could be consistent with Covid and we will see what the test shows.  However, it may very well be more due to a bronchitis.      Patient Active Problem List    Diagnosis Date Noted   • Lab test positive for detection of COVID-19 virus 12/23/2020   • Chronic lymphocytic leukemia (HCC) 10/30/2018   • Hyperuricemia w/o signs of inflam arthrit and tophaceous dis 10/30/2018       Current Outpatient Medications   Medication Sig Dispense Refill   • cefUROXime (CEFTIN) 500 MG Tab Take 1 Tab by mouth 2 times a day for 7  "days. 14 Tab 0   • promethazine (PHENERGAN) 6.25 MG/5ML Syrup TAKE 5ML BY MOUTH THREE TIMES DAILY AS NEEDED 180 mL 2   • zolpidem (AMBIEN) 5 MG Tab Take 1 Tab by mouth at bedtime as needed for Sleep for up to 30 days. 30 Tab 0   • LORazepam (ATIVAN) 0.5 MG Tab Take 1 Tab by mouth 1 time a day as needed for Anxiety for up to 30 days. 30 Tab 0   • azithromycin (ZITHROMAX) 250 MG Tab As directed on pack 6 Tab 0   • ergocalciferol (DRISDOL) 53578 UNIT capsule Take 1 Cap by mouth every 7 days. 4 Cap 11   • vitamin D (VITAMIND D3) 1000 UNIT Tab Take 1,000 Units by mouth every day.     • gabapentin (NEURONTIN) 100 MG Cap TK 1 C PO TID PRN     • gabapentin (NEURONTIN) 300 MG Cap TK 1 C PO TID PRN     • triamterene-hydrochlorothiazide (MAXZIDE 75-50) 75-50 MG Tab Take 1 Tab by mouth.     • FLUoxetine (PROZAC) 20 MG Cap Take 20 mg by mouth.     • PROAIR  (90 Base) MCG/ACT Aero Soln inhalation aerosol Inhale 2 Puffs by mouth every four hours as needed for Shortness of Breath. 90 g 0   • fluticasone (FLONASE) 50 MCG/ACT nasal spray Spray 1 Spray in nose every day. 16 g 0     No current facility-administered medications for this visit.          Allergies as of 01/21/2021   • (No Known Allergies)        ROS:  Denies, chest pain, Shortness of breath, Edema.  Had fever, some sore throat.  covid test is pending.    Ht 1.676 m (5' 6\")   Wt 86.6 kg (191 lb)   BMI 30.83 kg/m²       Physical Exam:  Constitutional: Alert, no distress, well-groomed.  Sounds congested, wet cough.  Skin: No rashes in visible areas.  Eye: Round. Conjunctiva clear, No icterus.   ENMT: Lips pink without lesions, good dentition, moist mucous membranes. Phonation normal.  Neck: No masses, no thyromegaly. Moves freely without pain.  Respiratory: Unlabored respiratory effort, no cough or audible wheeze  Psych: Alert and oriented x3, normal affect and mood.          Assessment and Plan.   60 y.o. female with the following issues.    1. History of 2019 " novel coronavirus disease (COVID-19)  Patient is retesting but this does not feel like the prior Covid infection    2. Cough  Patient has leukemia and is at risk for bacterial bronchitis or pneumonia.  She will be covered with cefuroxime.  - cefUROXime (CEFTIN) 500 MG Tab; Take 1 Tab by mouth 2 times a day for 7 days.  Dispense: 14 Tab; Refill: 0    3. Other headache syndrome  The headache is somewhat unusual.  No history of head trauma.  She will keep me updated.    4. Fever in other diseases  Broad-spectrum antibiotic for patient with CLL and fever.  - cefUROXime (CEFTIN) 500 MG Tab; Take 1 Tab by mouth 2 times a day for 7 days.  Dispense: 14 Tab; Refill: 0    Recheck 3 months, sooner as needed

## 2021-01-23 PROBLEM — Z86.16 HISTORY OF 2019 NOVEL CORONAVIRUS DISEASE (COVID-19): Status: ACTIVE | Noted: 2020-12-23

## 2021-02-12 ENCOUNTER — HOSPITAL ENCOUNTER (OUTPATIENT)
Dept: LAB | Facility: MEDICAL CENTER | Age: 61
End: 2021-02-12
Attending: INTERNAL MEDICINE
Payer: COMMERCIAL

## 2021-02-12 LAB
COVID ORDER STATUS COVID19: NORMAL
SARS-COV-2 RNA RESP QL NAA+PROBE: NOTDETECTED
SPECIMEN SOURCE: NORMAL

## 2021-02-12 PROCEDURE — U0003 INFECTIOUS AGENT DETECTION BY NUCLEIC ACID (DNA OR RNA); SEVERE ACUTE RESPIRATORY SYNDROME CORONAVIRUS 2 (SARS-COV-2) (CORONAVIRUS DISEASE [COVID-19]), AMPLIFIED PROBE TECHNIQUE, MAKING USE OF HIGH THROUGHPUT TECHNOLOGIES AS DESCRIBED BY CMS-2020-01-R: HCPCS

## 2021-02-12 PROCEDURE — U0005 INFEC AGEN DETEC AMPLI PROBE: HCPCS

## 2021-02-12 PROCEDURE — C9803 HOPD COVID-19 SPEC COLLECT: HCPCS

## 2021-02-18 ENCOUNTER — HOSPITAL ENCOUNTER (OUTPATIENT)
Dept: LAB | Facility: MEDICAL CENTER | Age: 61
End: 2021-02-18
Attending: INTERNAL MEDICINE
Payer: COMMERCIAL

## 2021-02-18 LAB
ALBUMIN SERPL BCP-MCNC: 4.6 G/DL (ref 3.2–4.9)
ALBUMIN/GLOB SERPL: 1.6 G/DL
ALP SERPL-CCNC: 128 U/L (ref 30–99)
ALT SERPL-CCNC: 18 U/L (ref 2–50)
ANION GAP SERPL CALC-SCNC: 15 MMOL/L (ref 7–16)
APPEARANCE UR: CLEAR
AST SERPL-CCNC: 21 U/L (ref 12–45)
BILIRUB SERPL-MCNC: 0.4 MG/DL (ref 0.1–1.5)
BILIRUB UR QL STRIP.AUTO: NEGATIVE
BUN SERPL-MCNC: 19 MG/DL (ref 8–22)
CALCIUM SERPL-MCNC: 9.7 MG/DL (ref 8.5–10.5)
CHLORIDE SERPL-SCNC: 92 MMOL/L (ref 96–112)
CO2 SERPL-SCNC: 26 MMOL/L (ref 20–33)
COLOR UR: YELLOW
CREAT SERPL-MCNC: 0.94 MG/DL (ref 0.5–1.4)
FERRITIN SERPL-MCNC: 7.5 NG/ML (ref 10–291)
GLOBULIN SER CALC-MCNC: 2.9 G/DL (ref 1.9–3.5)
GLUCOSE SERPL-MCNC: 140 MG/DL (ref 65–99)
GLUCOSE UR STRIP.AUTO-MCNC: NEGATIVE MG/DL
IRON SATN MFR SERPL: 8 % (ref 15–55)
IRON SERPL-MCNC: 42 UG/DL (ref 40–170)
KETONES UR STRIP.AUTO-MCNC: NEGATIVE MG/DL
LEUKOCYTE ESTERASE UR QL STRIP.AUTO: NEGATIVE
MICRO URNS: NORMAL
NITRITE UR QL STRIP.AUTO: NEGATIVE
PH UR STRIP.AUTO: 7 [PH] (ref 5–8)
POTASSIUM SERPL-SCNC: 3.1 MMOL/L (ref 3.6–5.5)
PROT SERPL-MCNC: 7.5 G/DL (ref 6–8.2)
PROT UR QL STRIP: NEGATIVE MG/DL
RBC UR QL AUTO: NEGATIVE
SARS-COV-2 AB SERPL QL IA: REACTIVE
SODIUM SERPL-SCNC: 133 MMOL/L (ref 135–145)
SP GR UR STRIP.AUTO: 1.02
TIBC SERPL-MCNC: 501 UG/DL (ref 250–450)
TRANSFERRIN SERPL-MCNC: 422 MG/DL (ref 200–370)
UIBC SERPL-MCNC: 459 UG/DL (ref 110–370)
UROBILINOGEN UR STRIP.AUTO-MCNC: 0.2 MG/DL

## 2021-02-18 PROCEDURE — 87040 BLOOD CULTURE FOR BACTERIA: CPT

## 2021-02-18 PROCEDURE — 80053 COMPREHEN METABOLIC PANEL: CPT

## 2021-02-18 PROCEDURE — 84466 ASSAY OF TRANSFERRIN: CPT

## 2021-02-18 PROCEDURE — 36415 COLL VENOUS BLD VENIPUNCTURE: CPT

## 2021-02-18 PROCEDURE — 83550 IRON BINDING TEST: CPT

## 2021-02-18 PROCEDURE — 82728 ASSAY OF FERRITIN: CPT

## 2021-02-18 PROCEDURE — 86769 SARS-COV-2 COVID-19 ANTIBODY: CPT

## 2021-02-18 PROCEDURE — 81003 URINALYSIS AUTO W/O SCOPE: CPT

## 2021-02-18 PROCEDURE — 83540 ASSAY OF IRON: CPT

## 2021-02-22 DIAGNOSIS — R79.89 HIGH SERUM TRANSFERRIN SATURATION: ICD-10-CM

## 2021-02-22 DIAGNOSIS — C91.10 CHRONIC LYMPHOCYTIC LEUKEMIA (HCC): ICD-10-CM

## 2021-02-22 DIAGNOSIS — R79.89 ELEVATED FERRITIN: ICD-10-CM

## 2021-02-23 LAB
BACTERIA BLD CULT: NORMAL
BACTERIA BLD CULT: NORMAL
SIGNIFICANT IND 70042: NORMAL
SIGNIFICANT IND 70042: NORMAL
SITE SITE: NORMAL
SITE SITE: NORMAL
SOURCE SOURCE: NORMAL
SOURCE SOURCE: NORMAL

## 2021-02-24 ENCOUNTER — OFFICE VISIT (OUTPATIENT)
Dept: MEDICAL GROUP | Facility: MEDICAL CENTER | Age: 61
End: 2021-02-24
Payer: COMMERCIAL

## 2021-02-24 VITALS
OXYGEN SATURATION: 99 % | BODY MASS INDEX: 33.75 KG/M2 | TEMPERATURE: 97.5 F | WEIGHT: 210 LBS | SYSTOLIC BLOOD PRESSURE: 124 MMHG | HEART RATE: 95 BPM | HEIGHT: 66 IN | DIASTOLIC BLOOD PRESSURE: 80 MMHG | RESPIRATION RATE: 16 BRPM

## 2021-02-24 DIAGNOSIS — G93.31 POST VIRAL SYNDROME: ICD-10-CM

## 2021-02-24 DIAGNOSIS — R71.8 MICROCYTOSIS: ICD-10-CM

## 2021-02-24 DIAGNOSIS — Z86.16 HISTORY OF 2019 NOVEL CORONAVIRUS DISEASE (COVID-19): ICD-10-CM

## 2021-02-24 DIAGNOSIS — E87.6 HYPOKALEMIA: ICD-10-CM

## 2021-02-24 DIAGNOSIS — R53.83 OTHER FATIGUE: ICD-10-CM

## 2021-02-24 DIAGNOSIS — E66.9 OBESITY, CLASS I, BMI 30-34.9: ICD-10-CM

## 2021-02-24 PROCEDURE — 99213 OFFICE O/P EST LOW 20 MIN: CPT | Performed by: FAMILY MEDICINE

## 2021-02-24 RX ORDER — POTASSIUM CHLORIDE 1500 MG/1
20 TABLET, EXTENDED RELEASE ORAL DAILY
COMMUNITY
Start: 2021-02-22 | End: 2022-09-23 | Stop reason: SDUPTHER

## 2021-02-25 ENCOUNTER — HOSPITAL ENCOUNTER (OUTPATIENT)
Dept: RADIOLOGY | Facility: MEDICAL CENTER | Age: 61
End: 2021-02-25
Attending: FAMILY MEDICINE
Payer: COMMERCIAL

## 2021-02-25 DIAGNOSIS — R79.89 HIGH SERUM TRANSFERRIN SATURATION: ICD-10-CM

## 2021-02-25 DIAGNOSIS — R79.89 ELEVATED FERRITIN: ICD-10-CM

## 2021-02-25 DIAGNOSIS — C91.10 CHRONIC LYMPHOCYTIC LEUKEMIA (HCC): ICD-10-CM

## 2021-02-25 PROCEDURE — 76700 US EXAM ABDOM COMPLETE: CPT

## 2021-02-25 NOTE — PROGRESS NOTES
Chief Complaint   Patient presents with   • Lab Results   • Fatigue       Subjective:     HPI:   Trey Moreno presents today with the followin. Hypokalemia  She is now on supplemental potassium.  She will be doing a follow up test in a week, this is through her oncologist.    2. Microcytosis  Iron is high, ferritin low, significant microcytosis.    3. History of 2019 novel coronavirus disease (COVID-19)/fatigue (other)   She is still quite fatigued, her cough is now better.  Only had to use cough syrup once in the last two weeks.  She is trying to look at the computer but has difficulty focusing.      Still not back to work.  Has not worked now since .  She was exhausted and had more headaches while she was trying to work.  She is still having instability in her vision, words and objects seem to be moving.  Her sense of smell is not yet back.  She is slowly improving.  Anticipate return to work .  Will follow up in between.         Patient Active Problem List    Diagnosis Date Noted   • Lab test positive for detection of COVID-19 virus 2020   • History of 2019 novel coronavirus disease (COVID-19) 2020   • Chronic lymphocytic leukemia (HCC) 10/30/2018   • Hyperuricemia w/o signs of inflam arthrit and tophaceous dis 10/30/2018       Current medicines (including changes today)  Current Outpatient Medications   Medication Sig Dispense Refill   • potassium Chloride ER (K-TAB) 20 MEQ Tab CR tablet Take 20 mEq by mouth every day.     • zolpidem (AMBIEN) 5 MG Tab TAKE 1 TABLET BY MOUTH AT BEDTIME AS NEEDED FOR SLEEP 90 Tab 0   • LORazepam (ATIVAN) 0.5 MG Tab TAKE 1 TABLET BY MOUTH EVERY DAY AS NEEDED FOR ANXIETY 90 Tab 0   • promethazine (PHENERGAN) 6.25 MG/5ML Syrup TAKE 5ML BY MOUTH THREE TIMES DAILY AS NEEDED 180 mL 2   • azithromycin (ZITHROMAX) 250 MG Tab As directed on pack 6 Tab 0   • ergocalciferol (DRISDOL) 40803 UNIT capsule Take 1 Cap by mouth every 7 days. 4 Cap 11   •  "vitamin D (VITAMIND D3) 1000 UNIT Tab Take 1,000 Units by mouth every day.     • gabapentin (NEURONTIN) 100 MG Cap TK 1 C PO TID PRN     • gabapentin (NEURONTIN) 300 MG Cap TK 1 C PO TID PRN     • triamterene-hydrochlorothiazide (MAXZIDE 75-50) 75-50 MG Tab Take 1 Tab by mouth.     • FLUoxetine (PROZAC) 20 MG Cap Take 20 mg by mouth.     • PROAIR  (90 Base) MCG/ACT Aero Soln inhalation aerosol Inhale 2 Puffs by mouth every four hours as needed for Shortness of Breath. 90 g 0   • fluticasone (FLONASE) 50 MCG/ACT nasal spray Spray 1 Spray in nose every day. 16 g 0     No current facility-administered medications for this visit.       No Known Allergies    ROS: As per HPI       Objective:     /80   Pulse 95   Temp 36.4 °C (97.5 °F)   Resp 16   Ht 1.676 m (5' 6\")   Wt 95.3 kg (210 lb)   SpO2 99%  Body mass index is 33.89 kg/m².    Physical Exam:  Constitutional: Well-developed and well-nourished. Not diaphoretic. No distress. Lucid and fluent.  Patient, student and staff all wearing masks.  Skin: Skin is warm and dry. No rash noted.  Head: Atraumatic without lesions.  Eyes: Conjunctivae and extraocular motions are normal. Pupils are equal, round, and reactive to light. No scleral icterus.   Ears:  External ears unremarkable.   Neck: Supple, trachea midline. No thyromegaly present. No cervical or supraclavicular lymphadenopathy. No JVD or carotid bruits appreciated  Cardiovascular: Regular rate and rhythm.  Normal S1, S2 without murmur appreciated.  Chest: Effort normal. Clear to auscultation throughout. No adventitious sounds. Breath sounds are a little distant bilaterally.  Abdomen: Soft, non tender, and without distention. Active bowel sounds in all four quadrants. No rebound, guarding, masses or hepatosplenomegaly.  Extremities: No cyanosis, clubbing, erythema, nor edema.   Neurological: Alert and oriented x 3. Very fine tremor appreciated.  Psychiatric:  Behavior, mood, and affect are " appropriate.       Assessment and Plan:     60 y.o. female with the following issues:    1. Hypokalemia     2. Microcytosis     3. History of 2019 novel coronavirus disease (COVID-19)     4. Obesity, Class I, BMI 30-34.9  Patient identified as having weight management issue.  Appropriate orders and counseling given.   5. Other fatigue  TSH WITH REFLEX TO FT4   6. Post viral syndrome  TSH WITH REFLEX TO FT4         Followup: Return in about 3 months (around 5/24/2021), or if symptoms worsen or fail to improve.

## 2021-03-13 PROBLEM — R05.3 PERSISTENT COUGH: Status: ACTIVE | Noted: 2021-03-13

## 2021-03-13 PROBLEM — G93.31 POSTVIRAL SYNDROME: Status: ACTIVE | Noted: 2021-03-13

## 2021-03-15 DIAGNOSIS — Z23 NEED FOR VACCINATION: ICD-10-CM

## 2021-04-13 ENCOUNTER — IMMUNIZATION (OUTPATIENT)
Dept: FAMILY PLANNING/WOMEN'S HEALTH CLINIC | Facility: IMMUNIZATION CENTER | Age: 61
End: 2021-04-13
Attending: INTERNAL MEDICINE
Payer: COMMERCIAL

## 2021-04-13 DIAGNOSIS — Z23 ENCOUNTER FOR VACCINATION: Primary | ICD-10-CM

## 2021-04-13 DIAGNOSIS — Z23 NEED FOR VACCINATION: ICD-10-CM

## 2021-04-13 PROCEDURE — 0001A PFIZER SARS-COV-2 VACCINE: CPT

## 2021-04-13 PROCEDURE — 91300 PFIZER SARS-COV-2 VACCINE: CPT

## 2021-04-22 DIAGNOSIS — F43.23 SITUATIONAL MIXED ANXIETY AND DEPRESSIVE DISORDER: ICD-10-CM

## 2021-04-22 RX ORDER — FLUOXETINE HYDROCHLORIDE 20 MG/1
20 CAPSULE ORAL DAILY
Qty: 90 CAPSULE | Refills: 2 | Status: SHIPPED | OUTPATIENT
Start: 2021-04-22 | End: 2021-12-03 | Stop reason: SDUPTHER

## 2021-04-26 ENCOUNTER — APPOINTMENT (OUTPATIENT)
Dept: RADIOLOGY | Facility: MEDICAL CENTER | Age: 61
End: 2021-04-26
Attending: FAMILY MEDICINE
Payer: COMMERCIAL

## 2021-05-12 DIAGNOSIS — R05.3 PERSISTENT COUGH FOR 3 WEEKS OR LONGER: ICD-10-CM

## 2021-05-21 ENCOUNTER — OFFICE VISIT (OUTPATIENT)
Dept: MEDICAL GROUP | Facility: MEDICAL CENTER | Age: 61
End: 2021-05-21
Payer: COMMERCIAL

## 2021-05-21 VITALS
DIASTOLIC BLOOD PRESSURE: 78 MMHG | OXYGEN SATURATION: 99 % | HEART RATE: 89 BPM | TEMPERATURE: 97 F | RESPIRATION RATE: 16 BRPM | HEIGHT: 66 IN | SYSTOLIC BLOOD PRESSURE: 106 MMHG | WEIGHT: 211 LBS | BODY MASS INDEX: 33.91 KG/M2

## 2021-05-21 DIAGNOSIS — R29.90 PERSISTENT NEUROLOGIC SYMPTOMS AFTER COVID-19: ICD-10-CM

## 2021-05-21 DIAGNOSIS — U09.9 PERSISTENT NEUROLOGIC SYMPTOMS AFTER COVID-19: ICD-10-CM

## 2021-05-21 DIAGNOSIS — B34.9 HEADACHE DUE TO VIRAL INFECTION: ICD-10-CM

## 2021-05-21 DIAGNOSIS — R51.9 HEADACHE DUE TO VIRAL INFECTION: ICD-10-CM

## 2021-05-21 DIAGNOSIS — F41.8 SITUATIONAL ANXIETY: ICD-10-CM

## 2021-05-21 DIAGNOSIS — G47.01 INSOMNIA DUE TO MEDICAL CONDITION: ICD-10-CM

## 2021-05-21 DIAGNOSIS — G93.31 POSTVIRAL SYNDROME: ICD-10-CM

## 2021-05-21 PROCEDURE — 99213 OFFICE O/P EST LOW 20 MIN: CPT | Performed by: FAMILY MEDICINE

## 2021-05-21 RX ORDER — ZOLPIDEM TARTRATE 5 MG/1
5 TABLET ORAL
Qty: 30 TABLET | Refills: 5 | Status: SHIPPED | OUTPATIENT
Start: 2021-05-21 | End: 2021-11-29

## 2021-05-21 RX ORDER — GABAPENTIN 100 MG/1
100 CAPSULE ORAL 3 TIMES DAILY
Qty: 90 CAPSULE | Refills: 3 | Status: SHIPPED | OUTPATIENT
Start: 2021-05-21 | End: 2021-08-25

## 2021-05-21 RX ORDER — LORAZEPAM 0.5 MG/1
0.5 TABLET ORAL DAILY
Qty: 30 TABLET | Refills: 5 | Status: SHIPPED | OUTPATIENT
Start: 2021-05-21 | End: 2021-11-15

## 2021-05-21 RX ORDER — IBUPROFEN 600 MG/1
TABLET ORAL
COMMUNITY
Start: 2021-04-08 | End: 2021-10-01 | Stop reason: SDUPTHER

## 2021-05-21 NOTE — PROGRESS NOTES
Chief Complaint   Patient presents with   • Headache   • Insomnia   • Anxiety       Subjective:     HPI:   Trey Fisher presents today with the followin. Headache due to viral infection/Persistent neurologic symptoms after COVID-19/Postviral syndrome  Headache is persistent.  She saw ENT who has diagnosed this a a post covid neurologic syndrome.  We will try gabapentin.  She is having to miss work still.  Tries to get on the computer bit it intensifies the headache.  She has to go to bed one to two days per week.      2. Insomnia due to medical condition  Continues the ambien.  This gives her 5-6 hours of sleep nightly.  Denies grogginess.  Not working as well with the headache.    3. Situational anxiety  The lorazepam helps quite a bit.  PDMP review shows no inconsistencies.  This is renewed.    Also has a nasal septal hole.       Patient Active Problem List    Diagnosis Date Noted   • Persistent neurologic symptoms after COVID-19 2021   • Headache due to viral infection 2021   • Insomnia due to medical condition 2021   • Situational anxiety 2021   • Persistent cough 2021   • Postviral syndrome 2021   • Lab test positive for detection of COVID-19 virus 2020   • History of 2019 novel coronavirus disease (COVID-19) 2020   • Chronic lymphocytic leukemia (HCC) 10/30/2018   • Hyperuricemia w/o signs of inflam arthrit and tophaceous dis 10/30/2018       Current medicines (including changes today)  Current Outpatient Medications   Medication Sig Dispense Refill   • gabapentin (NEURONTIN) 100 MG Cap Take 1 capsule by mouth 3 times a day. 90 capsule 3   • zolpidem (AMBIEN) 5 MG Tab Take 1 tablet by mouth at bedtime for 180 days. 30 tablet 5   • LORazepam (ATIVAN) 0.5 MG Tab Take 1 tablet by mouth every day for 180 days. 30 tablet 5   • ibuprofen (MOTRIN) 600 MG Tab TAKE 1 TABLET BY MOUTH EVERY 4 TO 6 HOURS AS NEEDED FOR PAIN     • Promethazine HCl 6.25 MG/5ML  "Solution TAKE 5 ML BY MOUTH THREE TIMES DAILY AS NEEDED 180 mL 2   • FLUoxetine (PROZAC) 20 MG Cap Take 1 capsule by mouth every day. 90 capsule 2   • potassium Chloride ER (K-TAB) 20 MEQ Tab CR tablet Take 20 mEq by mouth every day.     • ergocalciferol (DRISDOL) 93725 UNIT capsule Take 1 Cap by mouth every 7 days. 4 Cap 11   • vitamin D (VITAMIND D3) 1000 UNIT Tab Take 1,000 Units by mouth every day.     • triamterene-hydrochlorothiazide (MAXZIDE 75-50) 75-50 MG Tab Take 1 Tab by mouth.     • PROAIR  (90 Base) MCG/ACT Aero Soln inhalation aerosol Inhale 2 Puffs by mouth every four hours as needed for Shortness of Breath. 90 g 0     No current facility-administered medications for this visit.       No Known Allergies    ROS: As per HPI       Objective:     /78   Pulse 89   Temp 36.1 °C (97 °F)   Resp 16   Ht 1.676 m (5' 6\")   Wt 95.7 kg (211 lb)   SpO2 99%  Body mass index is 34.06 kg/m².    Physical Exam:  Constitutional: Well-developed and well-nourished. Not diaphoretic. No distress. Lucid and fluent.  Patient, physician and staff all wearing masks.  Skin: Skin is warm and dry. No rash noted.  Head: Atraumatic without lesions.  Eyes: Conjunctivae and extraocular motions are normal. Pupils are equal, round, and reactive to light. No scleral icterus.   Ears:  External ears unremarkable.   Neck: Supple, trachea midline. No thyromegaly present. No cervical or supraclavicular lymphadenopathy. No JVD or carotid bruits appreciated  Cardiovascular: Regular rate and rhythm.  Normal S1, S2 without murmur appreciated.  Chest: Effort normal. Clear to auscultation throughout. No adventitious sounds.   Extremities: No cyanosis, clubbing, erythema, nor edema.   Neurological: Alert and oriented x 3. DTRs 2+/3 and symmetric.   Psychiatric:  Behavior, mood, and affect are appropriate.       Assessment and Plan:     60 y.o. female with the following issues:    1. Headache due to viral infection     2. Persistent " neurologic symptoms after COVID-19  gabapentin (NEURONTIN) 100 MG Cap   3. Postviral syndrome  gabapentin (NEURONTIN) 100 MG Cap   4. Insomnia due to medical condition  zolpidem (AMBIEN) 5 MG Tab   5. Situational anxiety  LORazepam (ATIVAN) 0.5 MG Tab         Followup: Return in about 6 months (around 11/21/2021), or if symptoms worsen or fail to improve.

## 2021-06-17 ENCOUNTER — HOSPITAL ENCOUNTER (OUTPATIENT)
Dept: RADIOLOGY | Facility: MEDICAL CENTER | Age: 61
End: 2021-06-17
Attending: FAMILY MEDICINE
Payer: COMMERCIAL

## 2021-06-17 DIAGNOSIS — Z12.31 VISIT FOR SCREENING MAMMOGRAM: ICD-10-CM

## 2021-06-17 PROCEDURE — 77063 BREAST TOMOSYNTHESIS BI: CPT

## 2021-07-09 ENCOUNTER — NON-PROVIDER VISIT (OUTPATIENT)
Dept: MEDICAL GROUP | Facility: MEDICAL CENTER | Age: 61
End: 2021-07-09
Payer: COMMERCIAL

## 2021-07-09 DIAGNOSIS — Z23 NEED FOR VACCINATION: ICD-10-CM

## 2021-07-09 PROCEDURE — 90750 HZV VACC RECOMBINANT IM: CPT | Performed by: FAMILY MEDICINE

## 2021-07-09 PROCEDURE — 90472 IMMUNIZATION ADMIN EACH ADD: CPT | Performed by: FAMILY MEDICINE

## 2021-07-09 PROCEDURE — 90670 PCV13 VACCINE IM: CPT | Performed by: FAMILY MEDICINE

## 2021-07-09 PROCEDURE — 90471 IMMUNIZATION ADMIN: CPT | Performed by: FAMILY MEDICINE

## 2021-07-09 NOTE — NON-PROVIDER
"Trey Fisher is a 60 y.o. female here for a non-provider visit for:   PREVNAR (PCV13) 1 of 4  SHINGRIX (Shingles) 1 of 2    Reason for immunization: Overdue/Provider Recommended  Immunization records indicate need for vaccine: Yes, confirmed with Epic  Minimum interval has been met for this vaccine: Yes  ABN completed: Yes    VIS Dated 4/1/2020 was given to patient: Yes  All IAC Questionnaire questions were answered \"No.\"    Patient tolerated injection and no adverse effects were observed or reported: Yes    Pt scheduled for next dose in series: No      "

## 2021-08-24 DIAGNOSIS — H66.90 EAR INFECTION: ICD-10-CM

## 2021-08-24 RX ORDER — AZITHROMYCIN 250 MG/1
TABLET, FILM COATED ORAL
Qty: 6 TABLET | Refills: 0 | Status: SHIPPED | OUTPATIENT
Start: 2021-08-24 | End: 2021-12-03

## 2021-08-25 ENCOUNTER — OFFICE VISIT (OUTPATIENT)
Dept: MEDICAL GROUP | Facility: MEDICAL CENTER | Age: 61
End: 2021-08-25
Payer: COMMERCIAL

## 2021-08-25 VITALS
OXYGEN SATURATION: 97 % | HEART RATE: 90 BPM | DIASTOLIC BLOOD PRESSURE: 66 MMHG | WEIGHT: 205.25 LBS | BODY MASS INDEX: 32.99 KG/M2 | SYSTOLIC BLOOD PRESSURE: 126 MMHG | TEMPERATURE: 97.8 F | HEIGHT: 66 IN

## 2021-08-25 DIAGNOSIS — H60.311 ACUTE DIFFUSE OTITIS EXTERNA OF RIGHT EAR: ICD-10-CM

## 2021-08-25 DIAGNOSIS — U09.9 PERSISTENT NEUROLOGIC SYMPTOMS AFTER COVID-19: ICD-10-CM

## 2021-08-25 DIAGNOSIS — G93.31 POSTVIRAL SYNDROME: ICD-10-CM

## 2021-08-25 DIAGNOSIS — R29.90 PERSISTENT NEUROLOGIC SYMPTOMS AFTER COVID-19: ICD-10-CM

## 2021-08-25 PROCEDURE — 99213 OFFICE O/P EST LOW 20 MIN: CPT | Performed by: FAMILY MEDICINE

## 2021-08-25 RX ORDER — GABAPENTIN 100 MG/1
200 CAPSULE ORAL 3 TIMES DAILY
Qty: 180 CAPSULE | Refills: 2 | Status: SHIPPED | OUTPATIENT
Start: 2021-08-25 | End: 2021-11-27

## 2021-08-25 NOTE — PROGRESS NOTES
Chief Complaint   Patient presents with   • Headache     Since Feb   • Cough     x1week   • Otalgia     right       Subjective:     HPI:   Trey Fisher presents today with the followin. Postviral syndrome/Persistent neurologic symptoms after COVID-19  Persistent neurologic pain. The gabapentin helps a little, is on a low dose.  Increasing the dose discussed.  Continued cough, worsened with the smoke.  - gabapentin (NEURONTIN) 100 MG Cap; Take 2 Capsules by mouth 3 times a day. Note dose increase  Dispense: 180 Capsule; Refill: 2    2. Acute diffuse otitis externa of right ear  Right ear pain.  Antibiotic sent in late last night.  She still has pain.  On examination there appears to be erythema and swelling.    On disability through September.  She would like to try to go back to work mid September.    Patient Active Problem List    Diagnosis Date Noted   • Acute diffuse otitis externa of right ear 2021   • Persistent neurologic symptoms after COVID-19 2021   • Headache due to viral infection 2021   • Insomnia due to medical condition 2021   • Situational anxiety 2021   • Persistent cough 2021   • Postviral syndrome 2021   • Lab test positive for detection of COVID-19 virus 2020   • History of 2019 novel coronavirus disease (COVID-19) 2020   • Chronic lymphocytic leukemia (HCC) 10/30/2018   • Hyperuricemia w/o signs of inflam arthrit and tophaceous dis 10/30/2018       Current medicines (including changes today)  Current Outpatient Medications   Medication Sig Dispense Refill   • gabapentin (NEURONTIN) 100 MG Cap Take 2 Capsules by mouth 3 times a day. Note dose increase 180 Capsule 2   • azithromycin (ZITHROMAX) 250 MG Tab As directed on pack 6 Tablet 0   • ibuprofen (MOTRIN) 600 MG Tab TAKE 1 TABLET BY MOUTH EVERY 4 TO 6 HOURS AS NEEDED FOR PAIN     • zolpidem (AMBIEN) 5 MG Tab Take 1 tablet by mouth at bedtime for 180 days. 30 tablet 5   •  "LORazepam (ATIVAN) 0.5 MG Tab Take 1 tablet by mouth every day for 180 days. 30 tablet 5   • Promethazine HCl 6.25 MG/5ML Solution TAKE 5 ML BY MOUTH THREE TIMES DAILY AS NEEDED 180 mL 2   • FLUoxetine (PROZAC) 20 MG Cap Take 1 capsule by mouth every day. 90 capsule 2   • potassium Chloride ER (K-TAB) 20 MEQ Tab CR tablet Take 20 mEq by mouth every day.     • ergocalciferol (DRISDOL) 20630 UNIT capsule Take 1 Cap by mouth every 7 days. 4 Cap 11   • vitamin D (VITAMIND D3) 1000 UNIT Tab Take 1,000 Units by mouth every day.     • triamterene-hydrochlorothiazide (MAXZIDE 75-50) 75-50 MG Tab Take 1 Tab by mouth.     • PROAIR  (90 Base) MCG/ACT Aero Soln inhalation aerosol Inhale 2 Puffs by mouth every four hours as needed for Shortness of Breath. 90 g 0     No current facility-administered medications for this visit.       No Known Allergies    ROS: As per HPI       Objective:     /66 (BP Location: Right arm, Patient Position: Sitting, BP Cuff Size: Large adult)   Pulse 90   Temp 36.6 °C (97.8 °F) (Temporal)   Ht 1.676 m (5' 6\")   Wt 93.1 kg (205 lb 4 oz)   SpO2 97%  Body mass index is 33.13 kg/m².    Physical Exam:  Constitutional: Well-developed and well-nourished. Not diaphoretic. No distress. Lucid and fluent.  Skin: Skin is warm and dry. No rash noted.  Head: Atraumatic without lesions.  Eyes: Conjunctivae and extraocular motions are normal. Pupils are equal, round, and reactive to light. No scleral icterus.   Ears:  External ears unremarkable. Right external canal moderate erythema and edema.  No discharge noted.  Tender under ear externally.  No adenopathy appreciated.Tympanic membranes clear and intact.  Neck: Supple, trachea midline. No thyromegaly present. No cervical or supraclavicular lymphadenopathy. No JVD appreciated  Cardiovascular: Regular rate and rhythm.  Normal S1, S2 without murmur appreciated.  Chest: Effort normal. Clear to auscultation throughout. No adventitious sounds. "   Extremities: No cyanosis, clubbing, erythema, nor edema.   Neurological: Alert and oriented x 3. No tremor appreciated.  Psychiatric:  Behavior, mood, and affect are appropriate.       Assessment and Plan:     60 y.o. female with the following issues:    1. Postviral syndrome  gabapentin (NEURONTIN) 100 MG Cap   2. Persistent neurologic symptoms after COVID-19  gabapentin (NEURONTIN) 100 MG Cap   3. Acute diffuse otitis externa of right ear           Followup: Return in about 6 months (around 2/25/2022), or if symptoms worsen or fail to improve.

## 2021-08-28 ENCOUNTER — OFFICE VISIT (OUTPATIENT)
Dept: URGENT CARE | Facility: CLINIC | Age: 61
End: 2021-08-28
Payer: COMMERCIAL

## 2021-08-28 VITALS
SYSTOLIC BLOOD PRESSURE: 116 MMHG | HEART RATE: 86 BPM | OXYGEN SATURATION: 96 % | RESPIRATION RATE: 18 BRPM | TEMPERATURE: 98.2 F | DIASTOLIC BLOOD PRESSURE: 60 MMHG

## 2021-08-28 DIAGNOSIS — H69.91 DYSFUNCTION OF RIGHT EUSTACHIAN TUBE: ICD-10-CM

## 2021-08-28 DIAGNOSIS — J02.9 PHARYNGITIS, UNSPECIFIED ETIOLOGY: ICD-10-CM

## 2021-08-28 LAB
INT CON NEG: NORMAL
INT CON POS: NORMAL
S PYO AG THROAT QL: NEGATIVE

## 2021-08-28 PROCEDURE — 87880 STREP A ASSAY W/OPTIC: CPT | Performed by: FAMILY MEDICINE

## 2021-08-28 PROCEDURE — 99213 OFFICE O/P EST LOW 20 MIN: CPT | Performed by: FAMILY MEDICINE

## 2021-08-28 RX ORDER — PREDNISONE 20 MG/1
20 TABLET ORAL DAILY
Qty: 5 TABLET | Refills: 0 | Status: SHIPPED | OUTPATIENT
Start: 2021-08-28 | End: 2021-09-02

## 2021-08-28 ASSESSMENT — ENCOUNTER SYMPTOMS
SORE THROAT: 1
COUGH: 1
FEVER: 0
VOMITING: 0

## 2021-08-28 NOTE — PROGRESS NOTES
Subjective:     Trey Fisher is a 60 y.o. female who presents for Ear Pain (x4days, right ear feels worse after antibiotics, cough, sore throat, )    HPI  Pt presents for evaluation of ear pain, cough, and sore throat  Patient was seen by PCP on 8/25 for otitis externa of the right ear  She was started on azithromycin  Ear pain was improving, however now worsening again  Also having a sore throat on the right side this morning  Has a chronic cough    Review of Systems   Constitutional: Negative for fever.   HENT: Positive for ear pain and sore throat.    Respiratory: Positive for cough.    Gastrointestinal: Negative for vomiting.   Skin: Negative for rash.       PMH:  has a past medical history of History of 2019 novel coronavirus disease (COVID-19) (12/23/2020), Lab test positive for detection of COVID-19 virus (12/28/2020), Persistent cough (3/13/2021), and Postviral syndrome (3/13/2021).  MEDS:   Current Outpatient Medications:   •  predniSONE (DELTASONE) 20 MG Tab, Take 1 Tablet by mouth every day for 5 days., Disp: 5 Tablet, Rfl: 0  •  gabapentin (NEURONTIN) 100 MG Cap, Take 2 Capsules by mouth 3 times a day. Note dose increase, Disp: 180 Capsule, Rfl: 2  •  azithromycin (ZITHROMAX) 250 MG Tab, As directed on pack, Disp: 6 Tablet, Rfl: 0  •  ibuprofen (MOTRIN) 600 MG Tab, TAKE 1 TABLET BY MOUTH EVERY 4 TO 6 HOURS AS NEEDED FOR PAIN, Disp: , Rfl:   •  zolpidem (AMBIEN) 5 MG Tab, Take 1 tablet by mouth at bedtime for 180 days., Disp: 30 tablet, Rfl: 5  •  LORazepam (ATIVAN) 0.5 MG Tab, Take 1 tablet by mouth every day for 180 days., Disp: 30 tablet, Rfl: 5  •  Promethazine HCl 6.25 MG/5ML Solution, TAKE 5 ML BY MOUTH THREE TIMES DAILY AS NEEDED, Disp: 180 mL, Rfl: 2  •  FLUoxetine (PROZAC) 20 MG Cap, Take 1 capsule by mouth every day., Disp: 90 capsule, Rfl: 2  •  potassium Chloride ER (K-TAB) 20 MEQ Tab CR tablet, Take 20 mEq by mouth every day., Disp: , Rfl:   •  vitamin D (VITAMIND D3) 1000 UNIT Tab,  Take 1,000 Units by mouth every day., Disp: , Rfl:   •  triamterene-hydrochlorothiazide (MAXZIDE 75-50) 75-50 MG Tab, Take 1 Tab by mouth., Disp: , Rfl:   •  PROAIR  (90 Base) MCG/ACT Aero Soln inhalation aerosol, Inhale 2 Puffs by mouth every four hours as needed for Shortness of Breath., Disp: 90 g, Rfl: 0  •  ergocalciferol (DRISDOL) 35129 UNIT capsule, Take 1 Cap by mouth every 7 days. (Patient not taking: Reported on 8/28/2021), Disp: 4 Cap, Rfl: 11  ALLERGIES: No Known Allergies  SURGHX:   Past Surgical History:   Procedure Laterality Date   • ABDOMINAL HYSTERECTOMY TOTAL       SOCHX:  reports that she has never smoked. She has never used smokeless tobacco. She reports that she does not use drugs.     Objective:   /60 (BP Location: Left arm, Patient Position: Sitting, BP Cuff Size: Adult)   Pulse 86   Temp 36.8 °C (98.2 °F) (Temporal)   Resp 18   SpO2 96%     Physical Exam  Constitutional:       General: She is not in acute distress.     Appearance: She is well-developed. She is not diaphoretic.   HENT:      Head: Normocephalic and atraumatic.      Right Ear: Tympanic membrane, ear canal and external ear normal.      Left Ear: Tympanic membrane, ear canal and external ear normal.      Nose: Nose normal.      Mouth/Throat:      Mouth: Mucous membranes are moist.      Pharynx: Oropharynx is clear. No oropharyngeal exudate or posterior oropharyngeal erythema.   Pulmonary:      Effort: Pulmonary effort is normal.   Neurological:      Mental Status: She is alert.       Assessment/Plan:   Assessment    1. Dysfunction of right eustachian tube  - predniSONE (DELTASONE) 20 MG Tab; Take 1 Tablet by mouth every day for 5 days.  Dispense: 5 Tablet; Refill: 0  - POCT Rapid Strep A    2. Pharyngitis, unspecified etiology  - POCT Rapid Strep A    Patient with right eustachian tube dysfunction.  Point-of-care strep negative.  Has a normal ear exam today, no swelling of the ear canal, no erythema of TM, no  effusion present.  Advised that treating with more antibiotics would unlikely help symptoms.  Advised that trying short course of steroids could be more beneficial and patient agreeable.  Will trial 5-day course of prednisone and follow-up as needed.

## 2021-09-22 DIAGNOSIS — Z23 NEEDS FLU SHOT: ICD-10-CM

## 2021-09-22 RX ORDER — INFLUENZA A VIRUS A/BRISBANE/02/2018 (H1N1) RECOMBINANT HEMAGGLUTININ ANTIGEN, INFLUENZA A VIRUS A/KANSAS/14/2017 (H3N2) RECOMBINANT HEMAGGLUTININ ANTIGEN, INFLUENZA B VIRUS B/PHUKET/3073/2013 RECOMBINANT HEMAGGLUTININ ANTIGEN, AND INFLUENZA B VIRUS B/MARYLAND/15/2016 RECOMBINANT HEMAGGLUTININ ANTIGEN 45; 45; 45; 45 UG/.5ML; UG/.5ML; UG/.5ML; UG/.5ML
0.5 INJECTION INTRAMUSCULAR
Qty: 0.5 ML | Refills: 0 | Status: SHIPPED | OUTPATIENT
Start: 2021-09-22 | End: 2021-12-03

## 2021-09-22 NOTE — PROGRESS NOTES
Flu vaccine order placed at [facility] at patient's request. She states her is requires this as a prescription which makes absolutely no sense but I have sent it.

## 2021-10-01 ENCOUNTER — PATIENT MESSAGE (OUTPATIENT)
Dept: MEDICAL GROUP | Facility: MEDICAL CENTER | Age: 61
End: 2021-10-01

## 2021-10-01 RX ORDER — IBUPROFEN 600 MG/1
TABLET ORAL
Qty: 30 TABLET | Refills: 2 | Status: SHIPPED | OUTPATIENT
Start: 2021-10-01 | End: 2021-11-09

## 2021-10-01 NOTE — TELEPHONE ENCOUNTER
From: Trey Fisher  To: Physician Ivan Melgoza  Sent: 10/1/2021 12:37 PM PDT  Subject: Prescription Request     Anton Amaya,  Can you please request a prescription of Ibuprofen 600mg at the [facility]. This is the pharmacy in my file. Unfortuntally, in the last few weeks my head pain/nerve damage from Covid -19 has gotten worse again. I had to be in be two days recently due to the pain.   The 600 mg of ibuprofen does take a bit of the edge off of the pain when it gets really bad. I have increased the gabapintein medication as we discussed at my last visit to 600mg daily. I am not sure what has triggered the increase in pain.     Thank you so much   Best Regards,  Trey

## 2021-10-08 ENCOUNTER — HOSPITAL ENCOUNTER (OUTPATIENT)
Facility: MEDICAL CENTER | Age: 61
End: 2021-10-08
Attending: OTOLARYNGOLOGY | Admitting: OTOLARYNGOLOGY
Payer: COMMERCIAL

## 2021-11-09 RX ORDER — IBUPROFEN 600 MG/1
TABLET ORAL
Qty: 30 TABLET | Refills: 2 | Status: SHIPPED | OUTPATIENT
Start: 2021-11-09 | End: 2022-02-06 | Stop reason: SDUPTHER

## 2021-11-15 DIAGNOSIS — F41.8 SITUATIONAL ANXIETY: ICD-10-CM

## 2021-11-15 RX ORDER — LORAZEPAM 0.5 MG/1
TABLET ORAL
Qty: 30 TABLET | Refills: 2 | Status: SHIPPED | OUTPATIENT
Start: 2021-11-15 | End: 2022-02-16

## 2021-11-24 ENCOUNTER — HOSPITAL ENCOUNTER (OUTPATIENT)
Facility: MEDICAL CENTER | Age: 61
End: 2021-11-24
Attending: OTOLARYNGOLOGY | Admitting: OTOLARYNGOLOGY
Payer: COMMERCIAL

## 2021-11-27 DIAGNOSIS — G47.01 INSOMNIA DUE TO MEDICAL CONDITION: ICD-10-CM

## 2021-11-29 RX ORDER — ZOLPIDEM TARTRATE 5 MG/1
TABLET ORAL
Qty: 30 TABLET | Refills: 2 | Status: SHIPPED | OUTPATIENT
Start: 2021-11-29 | End: 2022-02-14 | Stop reason: SDUPTHER

## 2021-11-29 NOTE — TELEPHONE ENCOUNTER
Zolpidem renewal placed.  Last visit in August.  This is well within the 6-month timeframe.  90-day prescription for three 30-day fills placed.  PDMP review shows no inconsistencies.

## 2021-12-03 ENCOUNTER — OFFICE VISIT (OUTPATIENT)
Dept: MEDICAL GROUP | Facility: MEDICAL CENTER | Age: 61
End: 2021-12-03
Payer: COMMERCIAL

## 2021-12-03 VITALS
DIASTOLIC BLOOD PRESSURE: 62 MMHG | SYSTOLIC BLOOD PRESSURE: 118 MMHG | WEIGHT: 202 LBS | OXYGEN SATURATION: 98 % | TEMPERATURE: 97.7 F | HEART RATE: 94 BPM | BODY MASS INDEX: 32.47 KG/M2 | HEIGHT: 66 IN

## 2021-12-03 DIAGNOSIS — F43.23 SITUATIONAL MIXED ANXIETY AND DEPRESSIVE DISORDER: ICD-10-CM

## 2021-12-03 DIAGNOSIS — G93.31 POSTVIRAL SYNDROME: ICD-10-CM

## 2021-12-03 DIAGNOSIS — R29.90 PERSISTENT NEUROLOGIC SYMPTOMS AFTER COVID-19: ICD-10-CM

## 2021-12-03 DIAGNOSIS — G47.01 INSOMNIA DUE TO MEDICAL CONDITION: ICD-10-CM

## 2021-12-03 DIAGNOSIS — U09.9 PERSISTENT NEUROLOGIC SYMPTOMS AFTER COVID-19: ICD-10-CM

## 2021-12-03 DIAGNOSIS — Z23 NEED FOR SHINGLES VACCINE: ICD-10-CM

## 2021-12-03 PROBLEM — H60.311 ACUTE DIFFUSE OTITIS EXTERNA OF RIGHT EAR: Status: RESOLVED | Noted: 2021-08-25 | Resolved: 2021-12-03

## 2021-12-03 PROCEDURE — 90750 HZV VACC RECOMBINANT IM: CPT | Performed by: FAMILY MEDICINE

## 2021-12-03 PROCEDURE — 90471 IMMUNIZATION ADMIN: CPT | Performed by: FAMILY MEDICINE

## 2021-12-03 PROCEDURE — 99213 OFFICE O/P EST LOW 20 MIN: CPT | Mod: 25 | Performed by: FAMILY MEDICINE

## 2021-12-03 RX ORDER — AMOXICILLIN 500 MG/1
CAPSULE ORAL
COMMUNITY
Start: 2021-10-20 | End: 2021-12-03

## 2021-12-03 RX ORDER — FLUOXETINE HYDROCHLORIDE 20 MG/1
20 CAPSULE ORAL DAILY
Qty: 90 CAPSULE | Refills: 2 | Status: SHIPPED | OUTPATIENT
Start: 2021-12-03 | End: 2022-09-23 | Stop reason: SDUPTHER

## 2021-12-03 RX ORDER — GABAPENTIN 100 MG/1
300 CAPSULE ORAL 2 TIMES DAILY
Qty: 540 CAPSULE | Refills: 2 | Status: SHIPPED | OUTPATIENT
Start: 2021-12-03 | End: 2022-09-23 | Stop reason: SDUPTHER

## 2021-12-03 NOTE — PROGRESS NOTES
Chief Complaint   Patient presents with   • Medication Management   • Fall     left knee, body aches       Subjective:     HPI:   Trey Fisher presents today with the followin. Persistent neurologic symptoms after COVID-19/Postviral syndrome  Her neurologic headache is finally improving.  This began post Covid and has been reported as a post viral syndrome issue.  The gabapentin recommended by ENT has helped.  She wonders if she should wean off the gabapentin.  Since she is still having symptoms I would like her to stay at this dose for now.  If in a month or so she feels significantly better she can go down to 200 mg twice daily.  The issue is definitely improving.  However, the gabapentin is still helpful and is renewed.    2. Situational mixed anxiety and depressive disorder  She continues the fluoxetine which has been helpful.  She also continues the zolpidem and lorazepam which have been very helpful with her insomnia.    3. Insomnia due to medical condition  Patient has recent renewals for her insomnia medication which continues to be helpful.    4. Need for shingles vaccine  Patient has had her vaccine booster.  She also received her flu vaccine both in September.  She is interested in completing her Shingrix vaccine today.  She receives Shingrix No. 2 today.        Patient Active Problem List    Diagnosis Date Noted   • Persistent neurologic symptoms after COVID-19 2021   • Headache due to viral infection 2021   • Insomnia due to medical condition 2021   • Situational anxiety 2021   • Persistent cough 2021   • Postviral syndrome 2021   • Lab test positive for detection of COVID-19 virus 2020   • History of 2019 novel coronavirus disease (COVID-19) 2020   • Chronic lymphocytic leukemia (HCC) 10/30/2018   • Hyperuricemia w/o signs of inflam arthrit and tophaceous dis 10/30/2018       Current medicines (including changes today)  Current Outpatient  "Medications   Medication Sig Dispense Refill   • FLUoxetine (PROZAC) 20 MG Cap Take 1 Capsule by mouth every day. 90 Capsule 2   • gabapentin (NEURONTIN) 100 MG Cap Take 3 Capsules by mouth 2 times a day. 540 Capsule 2   • zolpidem (AMBIEN) 5 MG Tab TAKE 1 TABLET BY MOUTH AT BEDTIME 30 Tablet 2   • LORazepam (ATIVAN) 0.5 MG Tab TAKE 1 TABLET BY MOUTH EVERY DAY 30 Tablet 2   • ibuprofen (MOTRIN) 600 MG Tab TAKE 1 TABLET BY MOUTH EVERY 4 TO 6 HOURS AS NEEDED FOR PAIN 30 Tablet 2   • potassium Chloride ER (K-TAB) 20 MEQ Tab CR tablet Take 20 mEq by mouth every day.     • vitamin D (VITAMIND D3) 1000 UNIT Tab Take 1,000 Units by mouth every day.     • triamterene-hydrochlorothiazide (MAXZIDE 75-50) 75-50 MG Tab Take 1 Tab by mouth.     • Promethazine HCl 6.25 MG/5ML Solution TAKE 5 ML BY MOUTH THREE TIMES DAILY AS NEEDED 180 mL 2     No current facility-administered medications for this visit.       No Known Allergies    ROS: As per HPI       Objective:     /62 (BP Location: Right arm, Patient Position: Sitting, BP Cuff Size: Adult)   Pulse 94   Temp 36.5 °C (97.7 °F) (Temporal)   Ht 1.676 m (5' 6\")   Wt 91.6 kg (202 lb)   SpO2 98%  Body mass index is 32.6 kg/m².    Physical Exam:  Constitutional: Well-developed and well-nourished. Not diaphoretic. No distress. Lucid and fluent.  Patient, physician and staff all wearing masks.  Mask pulled down briefly to assess facial movements.  Skin: Skin is warm and dry. No rash noted.  Head: Atraumatic without lesions.  Eyes: Conjunctivae and extraocular motions are normal. Pupils are equal, round, and reactive to light. No scleral icterus.   Ears:  External ears unremarkable.   Neck: Supple, trachea midline. No thyromegaly present. No cervical or supraclavicular lymphadenopathy. No JVD or carotid bruits appreciated  Cardiovascular: Regular rate and rhythm.  Normal S1, S2 without murmur appreciated.  Chest: Effort normal. Clear to auscultation throughout. No adventitious " sounds.   Abdomen: Soft, non tender, and without distention. Active bowel sounds in all four quadrants. No rebound, guarding, masses or hepatosplenomegaly.  Extremities: No cyanosis, clubbing, erythema, nor edema.   Neurological: Alert and oriented x 3.  No tremor appreciated.  Movements symmetric.  Facial movements are normal.  Psychiatric:  Behavior, mood, and affect are appropriate.       Assessment and Plan:     61 y.o. female with the following issues:    1. Persistent neurologic symptoms after COVID-19  gabapentin (NEURONTIN) 100 MG Cap   2. Postviral syndrome  gabapentin (NEURONTIN) 100 MG Cap   3. Situational mixed anxiety and depressive disorder  FLUoxetine (PROZAC) 20 MG Cap   4. Insomnia due to medical condition     5. Need for shingles vaccine  Shingles Vaccine (Shingrix)         Followup: Return in about 6 months (around 6/3/2022), or if symptoms worsen or fail to improve.

## 2021-12-16 DIAGNOSIS — G47.01 INSOMNIA DUE TO MEDICAL CONDITION: ICD-10-CM

## 2021-12-16 RX ORDER — ZOLPIDEM TARTRATE 5 MG/1
5 TABLET ORAL NIGHTLY PRN
Qty: 6 TABLET | Refills: 0 | Status: SHIPPED | OUTPATIENT
Start: 2021-12-16 | End: 2021-12-22

## 2021-12-29 DIAGNOSIS — K08.89 TOOTH PAIN: ICD-10-CM

## 2021-12-29 RX ORDER — PENICILLIN V POTASSIUM 500 MG/1
1000 TABLET ORAL 2 TIMES DAILY
Qty: 40 TABLET | Refills: 0 | Status: SHIPPED | OUTPATIENT
Start: 2021-12-29 | End: 2022-01-08

## 2021-12-29 RX ORDER — HYDROCODONE BITARTRATE AND ACETAMINOPHEN 5; 325 MG/1; MG/1
1 TABLET ORAL EVERY 4 HOURS PRN
Qty: 40 TABLET | Refills: 0 | Status: SHIPPED | OUTPATIENT
Start: 2021-12-29 | End: 2022-01-05

## 2021-12-30 NOTE — PROGRESS NOTES
Patient is having tooth pain and has a dental appointment January 3.  The office is currently closed so she cannot get any medication from them.  She saw me a few weeks ago.  I am willing to write a 7-day prescription for the hydrocodone and also Pen-Vee K so that she can already be on antibiotics when she is seeing the dentist.  PDMP review shows no inconsistencies.  The prescription is written.

## 2022-02-06 RX ORDER — IBUPROFEN 600 MG/1
600 TABLET ORAL EVERY 8 HOURS PRN
Qty: 30 TABLET | Refills: 2 | Status: SHIPPED | OUTPATIENT
Start: 2022-02-06 | End: 2022-03-21

## 2022-02-11 ENCOUNTER — APPOINTMENT (OUTPATIENT)
Dept: MEDICAL GROUP | Facility: MEDICAL CENTER | Age: 62
End: 2022-02-11

## 2022-02-14 ENCOUNTER — TELEMEDICINE (OUTPATIENT)
Dept: MEDICAL GROUP | Facility: MEDICAL CENTER | Age: 62
End: 2022-02-14

## 2022-02-14 VITALS — RESPIRATION RATE: 14 BRPM | WEIGHT: 198 LBS | HEIGHT: 66 IN | BODY MASS INDEX: 31.82 KG/M2

## 2022-02-14 DIAGNOSIS — G47.01 INSOMNIA DUE TO MEDICAL CONDITION: ICD-10-CM

## 2022-02-14 DIAGNOSIS — B36.9 FUNGAL DERMATITIS: ICD-10-CM

## 2022-02-14 PROCEDURE — 99213 OFFICE O/P EST LOW 20 MIN: CPT | Mod: 95 | Performed by: FAMILY MEDICINE

## 2022-02-14 RX ORDER — KETOCONAZOLE 20 MG/G
CREAM TOPICAL
Qty: 30 G | Refills: 2 | Status: SHIPPED | OUTPATIENT
Start: 2022-02-14

## 2022-02-14 RX ORDER — ZOLPIDEM TARTRATE 10 MG/1
10 TABLET ORAL
Qty: 30 TABLET | Refills: 2 | Status: SHIPPED | OUTPATIENT
Start: 2022-02-14 | End: 2022-05-15

## 2022-02-14 ASSESSMENT — PATIENT HEALTH QUESTIONNAIRE - PHQ9: CLINICAL INTERPRETATION OF PHQ2 SCORE: 0

## 2022-02-14 NOTE — PROGRESS NOTES
Virtual Visit: Established Patient   This visit was conducted via Zoom  using secure and encrypted videoconferencing technology. The patient was in a private location in the state of [place].    The patient's identity was confirmed and verbal consent was obtained for this virtual visit.      CC: Rash at corners of the mouth, insomnia, lab discussion                                                                                                                                     HPI:   Trey presents today with the following.    1. Fungal dermatitis  Patient has noted in the last month irritation at the side of her mouth.  She has noticed this more with mask wearing.  Sometimes her mouth cracks when speaking.  She has been using Neosporin but the problem is worsening.  Have recommended Aquaphor in the morning and the ketoconazole cream at night.    2. Insomnia due to medical condition  Patient is having increased insomnia.  Is often difficult to get to sleep and she does not stay asleep.  The 3-year anniversary of her 's death is coming.  She has been working with a therapist and feels she is making some progress but it is not straightforward.  Have increased the Ambien 10 mg.  I have asked her to take 5 mg or no medication at least a couple nights a week.  She can use melatonin on those nights if she wishes.  PDMP review shows no inconsistencies.  Patient's use of alcohol is very rare.  She is aware she should not combine that with Ambien.    I had ordered a TSH with reflex to free T4 in February 2021.  She was particularly fatigued at that time.  She worked with her hematologist who found that she was iron deficient.  Since she is doing better I do not see a need for her to complete that test.    Discussed as needed immunizations    Patient Active Problem List    Diagnosis Date Noted   • Persistent neurologic symptoms after COVID-19 05/21/2021   • Headache due to viral infection 05/21/2021   • Insomnia due  "to medical condition 05/21/2021   • Situational anxiety 05/21/2021   • Persistent cough 03/13/2021   • Postviral syndrome 03/13/2021   • Lab test positive for detection of COVID-19 virus 12/23/2020   • History of 2019 novel coronavirus disease (COVID-19) 12/23/2020   • Chronic lymphocytic leukemia (HCC) 10/30/2018   • Hyperuricemia w/o signs of inflam arthrit and tophaceous dis 10/30/2018       Current Outpatient Medications   Medication Sig Dispense Refill   • ketoconazole (NIZORAL) 2 % Cream Apply to affected area daily 30 g 2   • zolpidem (AMBIEN) 10 MG Tab Take 1 Tablet by mouth at bedtime for 90 days. Note dose increase.  30 tablets is a 30 day quantity 30 Tablet 2   • ibuprofen (MOTRIN) 600 MG Tab Take 1 Tablet by mouth every 8 hours as needed for Inflammation. 30 Tablet 2   • FLUoxetine (PROZAC) 20 MG Cap Take 1 Capsule by mouth every day. 90 Capsule 2   • gabapentin (NEURONTIN) 100 MG Cap Take 3 Capsules by mouth 2 times a day. 540 Capsule 2   • Promethazine HCl 6.25 MG/5ML Solution TAKE 5 ML BY MOUTH THREE TIMES DAILY AS NEEDED 180 mL 2   • potassium Chloride ER (K-TAB) 20 MEQ Tab CR tablet Take 20 mEq by mouth every day.     • vitamin D (VITAMIND D3) 1000 UNIT Tab Take 1,000 Units by mouth every day.     • triamterene-hydrochlorothiazide (MAXZIDE 75-50) 75-50 MG Tab Take 1 Tab by mouth.       No current facility-administered medications for this visit.         Allergies as of 02/14/2022   • (No Known Allergies)        ROS:  Denies, chest pain, Shortness of breath, Edema.     Resp 14 Comment: observed  Ht 1.676 m (5' 6\")   Wt 89.8 kg (198 lb)   BMI 31.96 kg/m²       Physical Exam:  Constitutional: Alert, no distress, well-groomed.  Skin: No rashes in visible areas other than raw patches at the side of her mouth bilaterally.  There is mild erythema.  Eye: Round. Conjunctiva clear, No icterus.   ENMT: Lips pink without lesions, good dentition, moist mucous membranes. Phonation normal.  Neck: No masses, no " thyromegaly. Moves freely without pain.  Respiratory: Unlabored respiratory effort, no cough or audible wheeze  Psych: Alert and oriented x3, normal affect and mood.          Assessment and Plan.   61 y.o. female with the following issues.    1. Fungal dermatitis  Patient does appear to have a fungal dermatitis.  I have asked her to use Aquaphor in the mornings and the ketoconazole cream sparingly at bedtime.  - ketoconazole (NIZORAL) 2 % Cream; Apply to affected area daily  Dispense: 30 g; Refill: 2    2. Insomnia due to medical condition  Patient is having more severe insomnia tied with her anniversary of her 's death and her anxiety.  She is working with a therapist and she will continue to do so.  - zolpidem (AMBIEN) 10 MG Tab; Take 1 Tablet by mouth at bedtime for 90 days. Note dose increase.  30 tablets is a 30 day quantity  Dispense: 30 Tablet; Refill: 2    Recheck 6 months, sooner as needed.

## 2022-02-15 ENCOUNTER — TELEPHONE (OUTPATIENT)
Dept: MEDICAL GROUP | Facility: MEDICAL CENTER | Age: 62
End: 2022-02-15
Payer: COMMERCIAL

## 2022-02-15 ENCOUNTER — TELEPHONE (OUTPATIENT)
Dept: MEDICAL GROUP | Facility: MEDICAL CENTER | Age: 62
End: 2022-02-15

## 2022-02-15 NOTE — TELEPHONE ENCOUNTER
DOCUMENTATION OF PAR STATUS:    1. Name of Medication & Dose: Zolpidem (Ambien) 10 mg     2. Name of Prescription Coverage Company & phone #: [employer]    3. Date Prior Auth Submitted: 2/15/22    4. What information was given to obtain insurance decision? Pt, insurance and provider informaiton    5. Prior Auth Status? Pending    6. Patient Notified: N\A

## 2022-03-16 NOTE — TELEPHONE ENCOUNTER
FINAL PRIOR AUTHORIZATION STATUS:    1.  Name of Medication & Dose: TUNDE pepper    2. Prior Auth Status: Approved through CoverMyMeds     3. Action Taken: Pharmacy Notified: yes Patient Notified: This was competed on Feb 18th

## 2022-03-21 RX ORDER — IBUPROFEN 600 MG/1
600 TABLET ORAL EVERY 8 HOURS PRN
Qty: 30 TABLET | Refills: 2 | Status: SHIPPED | OUTPATIENT
Start: 2022-03-21 | End: 2022-05-15

## 2022-05-15 DIAGNOSIS — G47.01 INSOMNIA DUE TO MEDICAL CONDITION: ICD-10-CM

## 2022-05-15 DIAGNOSIS — M19.90 ARTHRITIS: ICD-10-CM

## 2022-05-15 DIAGNOSIS — F41.8 SITUATIONAL ANXIETY: ICD-10-CM

## 2022-05-15 RX ORDER — ZOLPIDEM TARTRATE 10 MG/1
10 TABLET ORAL
Qty: 30 TABLET | Refills: 2 | Status: SHIPPED | OUTPATIENT
Start: 2022-05-15 | End: 2022-08-12

## 2022-05-15 RX ORDER — LORAZEPAM 0.5 MG/1
TABLET ORAL
Qty: 30 TABLET | Refills: 2 | Status: SHIPPED | OUTPATIENT
Start: 2022-05-15 | End: 2022-08-12

## 2022-05-15 RX ORDER — IBUPROFEN 600 MG/1
600 TABLET ORAL EVERY 8 HOURS PRN
Qty: 30 TABLET | Refills: 2 | Status: SHIPPED | OUTPATIENT
Start: 2022-05-15 | End: 2022-07-12

## 2022-05-15 NOTE — TELEPHONE ENCOUNTER
Patient last seen 3 months ago.  Patient has situational anxiety and also anxiety associated insomnia.  Zolpidem and lorazepam renewed.  Ibuprofen also renewed for generalized pain primarily arthritic.

## 2022-07-12 DIAGNOSIS — M19.90 ARTHRITIS: ICD-10-CM

## 2022-07-12 RX ORDER — IBUPROFEN 600 MG/1
600 TABLET ORAL EVERY 8 HOURS PRN
Qty: 30 TABLET | Refills: 2 | Status: SHIPPED | OUTPATIENT
Start: 2022-07-12 | End: 2022-09-23 | Stop reason: SDUPTHER

## 2022-08-10 DIAGNOSIS — F41.8 SITUATIONAL ANXIETY: ICD-10-CM

## 2022-08-11 DIAGNOSIS — G47.01 INSOMNIA DUE TO MEDICAL CONDITION: ICD-10-CM

## 2022-08-12 RX ORDER — ZOLPIDEM TARTRATE 10 MG/1
TABLET ORAL
Qty: 30 TABLET | Refills: 0 | Status: SHIPPED | OUTPATIENT
Start: 2022-08-12 | End: 2022-09-14 | Stop reason: SDUPTHER

## 2022-08-12 RX ORDER — LORAZEPAM 0.5 MG/1
TABLET ORAL
Qty: 30 TABLET | Refills: 0 | Status: SHIPPED | OUTPATIENT
Start: 2022-08-12 | End: 2022-09-14 | Stop reason: SDUPTHER

## 2022-08-13 NOTE — TELEPHONE ENCOUNTER
PDMP review shows no inconsistencies.  Patient was last seen 5 months ago.  1 refill authorization is sent to the pharmacy.  Patient has upcoming appointment.

## 2022-09-12 ENCOUNTER — APPOINTMENT (OUTPATIENT)
Dept: MEDICAL GROUP | Facility: MEDICAL CENTER | Age: 62
End: 2022-09-12

## 2022-09-12 DIAGNOSIS — G47.01 INSOMNIA DUE TO MEDICAL CONDITION: ICD-10-CM

## 2022-09-12 DIAGNOSIS — F41.8 SITUATIONAL ANXIETY: ICD-10-CM

## 2022-09-12 RX ORDER — LORAZEPAM 0.5 MG/1
0.5 TABLET ORAL
Qty: 30 TABLET | Refills: 0 | OUTPATIENT
Start: 2022-09-12 | End: 2022-10-12

## 2022-09-12 RX ORDER — ZOLPIDEM TARTRATE 10 MG/1
TABLET ORAL
Qty: 30 TABLET | Refills: 0 | OUTPATIENT
Start: 2022-09-12 | End: 2022-10-12

## 2022-09-12 NOTE — TELEPHONE ENCOUNTER
Received request via: Patient    Was the patient seen in the last year in this department? Yes    Does the patient have an active prescription (recently filled or refills available) for medication(s) requested? No    Pt HAS APPT WITH YOU 9/16*

## 2022-09-14 DIAGNOSIS — F41.8 SITUATIONAL ANXIETY: ICD-10-CM

## 2022-09-14 DIAGNOSIS — G47.01 INSOMNIA DUE TO MEDICAL CONDITION: ICD-10-CM

## 2022-09-14 RX ORDER — LORAZEPAM 0.5 MG/1
0.5 TABLET ORAL
Qty: 5 TABLET | Refills: 0 | Status: SHIPPED | OUTPATIENT
Start: 2022-09-14 | End: 2022-09-23 | Stop reason: SDUPTHER

## 2022-09-14 RX ORDER — ZOLPIDEM TARTRATE 10 MG/1
10 TABLET ORAL
Qty: 5 EACH | Refills: 0 | Status: SHIPPED | OUTPATIENT
Start: 2022-09-14 | End: 2022-09-23 | Stop reason: SDUPTHER

## 2022-09-14 NOTE — PROGRESS NOTES
Patient had to reschedule because I was running so far behind yesterday.  She has an appointment for this Friday.  A short prescription is sent to the pharmacy for her until we can see each other at the appointment.  PDMP review shows no inconsistencies.

## 2022-09-16 ENCOUNTER — APPOINTMENT (OUTPATIENT)
Dept: MEDICAL GROUP | Facility: MEDICAL CENTER | Age: 62
End: 2022-09-16

## 2022-09-19 ENCOUNTER — APPOINTMENT (OUTPATIENT)
Dept: MEDICAL GROUP | Facility: MEDICAL CENTER | Age: 62
End: 2022-09-19

## 2022-09-23 ENCOUNTER — TELEMEDICINE (OUTPATIENT)
Dept: MEDICAL GROUP | Facility: MEDICAL CENTER | Age: 62
End: 2022-09-23

## 2022-09-23 VITALS — WEIGHT: 192 LBS | BODY MASS INDEX: 30.86 KG/M2 | RESPIRATION RATE: 14 BRPM | HEIGHT: 66 IN

## 2022-09-23 DIAGNOSIS — U09.9 PERSISTENT NEUROLOGIC SYMPTOMS AFTER COVID-19: ICD-10-CM

## 2022-09-23 DIAGNOSIS — G47.01 INSOMNIA DUE TO MEDICAL CONDITION: ICD-10-CM

## 2022-09-23 DIAGNOSIS — G93.31 POSTVIRAL SYNDROME: ICD-10-CM

## 2022-09-23 DIAGNOSIS — F43.23 SITUATIONAL MIXED ANXIETY AND DEPRESSIVE DISORDER: ICD-10-CM

## 2022-09-23 DIAGNOSIS — I10 ESSENTIAL HYPERTENSION: ICD-10-CM

## 2022-09-23 DIAGNOSIS — R29.90 PERSISTENT NEUROLOGIC SYMPTOMS AFTER COVID-19: ICD-10-CM

## 2022-09-23 DIAGNOSIS — Z28.39 INCOMPLETE IMMUNIZATION STATUS: ICD-10-CM

## 2022-09-23 DIAGNOSIS — Z12.39 SCREENING BREAST EXAMINATION: ICD-10-CM

## 2022-09-23 DIAGNOSIS — M19.90 ARTHRITIS: ICD-10-CM

## 2022-09-23 DIAGNOSIS — F41.8 SITUATIONAL ANXIETY: ICD-10-CM

## 2022-09-23 PROCEDURE — 99214 OFFICE O/P EST MOD 30 MIN: CPT | Mod: 95 | Performed by: FAMILY MEDICINE

## 2022-09-23 RX ORDER — TRIAMTERENE AND HYDROCHLOROTHIAZIDE 75; 50 MG/1; MG/1
1 TABLET ORAL DAILY
Qty: 90 TABLET | Refills: 3 | Status: SHIPPED | OUTPATIENT
Start: 2022-09-23 | End: 2023-03-14 | Stop reason: SDUPTHER

## 2022-09-23 RX ORDER — IBUPROFEN 600 MG/1
600 TABLET ORAL EVERY 8 HOURS PRN
Qty: 30 TABLET | Refills: 4 | Status: SHIPPED | OUTPATIENT
Start: 2022-09-23 | End: 2023-01-02

## 2022-09-23 RX ORDER — ZOLPIDEM TARTRATE 10 MG/1
10 TABLET ORAL
Qty: 30 EACH | Refills: 5 | Status: SHIPPED | OUTPATIENT
Start: 2022-09-23 | End: 2023-03-18

## 2022-09-23 RX ORDER — GABAPENTIN 100 MG/1
300 CAPSULE ORAL 2 TIMES DAILY
Qty: 540 CAPSULE | Refills: 2 | Status: SHIPPED | OUTPATIENT
Start: 2022-09-23 | End: 2023-03-14 | Stop reason: SDUPTHER

## 2022-09-23 RX ORDER — LORAZEPAM 0.5 MG/1
0.5 TABLET ORAL
Qty: 30 TABLET | Refills: 5 | Status: SHIPPED | OUTPATIENT
Start: 2022-09-23 | End: 2023-03-18

## 2022-09-23 RX ORDER — FLUOXETINE HYDROCHLORIDE 20 MG/1
20 CAPSULE ORAL DAILY
Qty: 90 CAPSULE | Refills: 2 | Status: SHIPPED | OUTPATIENT
Start: 2022-09-23 | End: 2023-03-14 | Stop reason: SDUPTHER

## 2022-09-23 RX ORDER — POTASSIUM CHLORIDE 20 MEQ/1
20 TABLET, EXTENDED RELEASE ORAL DAILY
Qty: 90 TABLET | Refills: 3 | Status: SHIPPED | OUTPATIENT
Start: 2022-09-23

## 2022-09-23 NOTE — PROGRESS NOTES
Virtual Visit: Established Patient   This visit was conducted via Zoom  using secure and encrypted videoconferencing technology. The patient was in a private location in the state of [place].    The patient's identity was confirmed and verbal consent was obtained for this virtual visit.      CC:  neurologic symptoms, immunizations discussed, hypertension, anxiety, anxiety, insomnia, arthritis                                                                                                                                    HPI:   Trey presents today with the following.    1. Persistent neurologic symptoms after COVID-19/Postviral syndrome  Patient does continue to have persistent head pain after her COVID-19 infection.  This is a persistent postviral syndrome.  I believe she was more vulnerable to this due to her chronic leukemia.  She states the gabapentin continues to be somewhat helpful but the problem is persistent overall.  Patient did try to go back to work full-time but found that this was not achievable due to her head pain and fatigue.  She is now working part-time from home.    2. Incomplete immunization status  We did discuss another COVID-vaccine.  However, on her last COVID vaccination she really had an escalation of her head pain so we are holding off for now.    3. Essential hypertension  HTN - Chronic condition stable. Currently taking all meds as directed.   She is not taking baby aspirin daily.   She is monitoring BP at home. She does check her pressure episodically and it has been good but she will check again and let me know.  Denies symptoms low BP: light-headed, tunnel-vision, unusual fatigue.   Denies symptoms high BP:pounding headache, visual changes, palpitations, flushed face.   Denies medicine side effects: unusual fatigue, slow heartbeat, foot/leg swelling, cough.  Follow-up lab orders discussed and placed.    4. Situational mixed anxiety and depressive disorder  Patient states the  fluoxetine continues to be helpful in controlling both depression and anxiety.  She feels she is functional.  She is a little depressed over her persistent COVID symptoms.  This is renewed.    5. Screening breast examination  Mammogram order discussed and placed.    6. Situational anxiety  Patient uses the lorazepam for anxiety at all and often for sleep.  She feels it is very helpful.  She is taking 1/day.  Denies any excessive somnolence or problems.  No aberrant reactions have been observed or reported.  PDMP review shows no inconsistencies.  The medication is renewed.    7. Insomnia due to medical condition  Patient continues to get benefit from the zolpidem.  She states it gives her 6 hours of restful sleep.  Denies sleepwalking or sleep eating or aberrant sleep related behavior.  PDMP review shows no inconsistencies.  This is renewed.    8. Arthritis  Patient finds the ibuprofen to be helpful as rescue for her arthritis.  She tries to limit to once daily or less but occasionally has to take more.  She takes the medication with food and water.      Patient Active Problem List    Diagnosis Date Noted    Persistent neurologic symptoms after COVID-19 05/21/2021    Headache due to viral infection 05/21/2021    Insomnia due to medical condition 05/21/2021    Situational anxiety 05/21/2021    Persistent cough 03/13/2021    Postviral syndrome 03/13/2021    Lab test positive for detection of COVID-19 virus 12/23/2020    History of 2019 novel coronavirus disease (COVID-19) 12/23/2020    Chronic lymphocytic leukemia (HCC) 10/30/2018    Hyperuricemia w/o signs of inflam arthrit and tophaceous dis 10/30/2018       Current Outpatient Medications   Medication Sig Dispense Refill    FLUoxetine (PROZAC) 20 MG Cap Take 1 Capsule by mouth every day. 90 Capsule 2    gabapentin (NEURONTIN) 100 MG Cap Take 3 Capsules by mouth 2 times a day. 540 Capsule 2    triamterene-hydrochlorothiazide (MAXZIDE 75-50) 75-50 MG Tab Take 1 Tablet  "by mouth every day. 90 Tablet 3    potassium chloride SA (KLOR-CON M20) 20 MEQ Tab CR Take 1 Tablet by mouth every day. 90 Tablet 3    LORazepam (ATIVAN) 0.5 MG Tab Take 1 Tablet by mouth every day for 180 days. 30 tablets is a 30 day quantity 30 Tablet 5    zolpidem (AMBIEN) 10 MG Tab Take 1 Tablet by mouth at bedtime for 180 days. 30 tablets is a 30 day quantity 30 Each 5    ibuprofen (MOTRIN) 600 MG Tab Take 1 Tablet by mouth every 8 hours as needed for Inflammation. 30 Tablet 4    ketoconazole (NIZORAL) 2 % Cream Apply to affected area daily 30 g 2    Promethazine HCl 6.25 MG/5ML Solution TAKE 5 ML BY MOUTH THREE TIMES DAILY AS NEEDED 180 mL 2    vitamin D (VITAMIND D3) 1000 UNIT Tab Take 1,000 Units by mouth every day.       No current facility-administered medications for this visit.         Allergies as of 09/23/2022    (No Known Allergies)        ROS:  Denies, chest pain, Shortness of breath, Edema.     Resp 14 Comment: observed  Ht 1.676 m (5' 6\")   Wt 87.1 kg (192 lb)   BMI 30.99 kg/m²       Physical Exam:  Constitutional: Alert, no distress, well-groomed.  Skin: No rashes in visible areas.  Eye: Round. Conjunctiva clear, lNo icterus.   ENMT: Lips pink without lesions, good dentition, moist mucous membranes. Phonation normal.  Neck: No masses, no thyromegaly. Moves freely without pain.  CV: Pulse as reported by patient  Respiratory: Unlabored respiratory effort, no cough or audible wheeze  Psych: Alert and oriented x3, normal affect and mood.          Assessment and Plan.   62 y.o. female with the following issues.    1. Persistent neurologic symptoms after COVID-19  The regimen is helpful and well-tolerated and is renewed  - gabapentin (NEURONTIN) 100 MG Cap; Take 3 Capsules by mouth 2 times a day.  Dispense: 540 Capsule; Refill: 2    2. Postviral syndrome  The regimen is helpful and well-tolerated and is renewed  - gabapentin (NEURONTIN) 100 MG Cap; Take 3 Capsules by mouth 2 times a day.  Dispense: " 540 Capsule; Refill: 2    3. Incomplete immunization status  Discussed common we will hold off on COVID-vaccine for now due to potential increased risk of worsening her symptoms which happened when she last took her    4. Essential hypertension  She has tolerated this well.  She will recheck her blood pressure and let me know how she is doing  - triamterene-hydrochlorothiazide (MAXZIDE 75-50) 75-50 MG Tab; Take 1 Tablet by mouth every day.  Dispense: 90 Tablet; Refill: 3  - potassium chloride SA (KLOR-CON M20) 20 MEQ Tab CR; Take 1 Tablet by mouth every day.  Dispense: 90 Tablet; Refill: 3    5. Situational mixed anxiety and depressive disorder  The medication is helpful and well-tolerated and is renewed  - FLUoxetine (PROZAC) 20 MG Cap; Take 1 Capsule by mouth every day.  Dispense: 90 Capsule; Refill: 2    6. Screening breast examination  Mammogram order discussed and placed  - MA-SCREENING MAMMO BILAT W/TOMOSYNTHESIS W/CAD; Future    7. Situational anxiety  The medication is helpful and well-tolerated and is renewed  - LORazepam (ATIVAN) 0.5 MG Tab; Take 1 Tablet by mouth every day for 180 days. 30 tablets is a 30 day quantity  Dispense: 30 Tablet; Refill: 5    8. Insomnia due to medical condition  The medication is helpful and well-tolerated and is renewed  - zolpidem (AMBIEN) 10 MG Tab; Take 1 Tablet by mouth at bedtime for 180 days. 30 tablets is a 30 day quantity  Dispense: 30 Each; Refill: 5    9. Arthritis  The ibuprofen continues to be helpful and is renewed.  Patient understands the risks.  - ibuprofen (MOTRIN) 600 MG Tab; Take 1 Tablet by mouth every 8 hours as needed for Inflammation.  Dispense: 30 Tablet; Refill: 4    Recheck 6 months, sooner as needed

## 2022-10-10 DIAGNOSIS — J20.9 ACUTE BRONCHITIS, UNSPECIFIED ORGANISM: ICD-10-CM

## 2022-10-10 RX ORDER — AZITHROMYCIN 250 MG/1
TABLET, FILM COATED ORAL
Qty: 6 TABLET | Refills: 0 | Status: SHIPPED | OUTPATIENT
Start: 2022-10-10 | End: 2022-12-06 | Stop reason: SDUPTHER

## 2022-10-12 DIAGNOSIS — R05.3 PERSISTENT COUGH FOR 3 WEEKS OR LONGER: ICD-10-CM

## 2022-10-25 ENCOUNTER — APPOINTMENT (OUTPATIENT)
Dept: MEDICAL GROUP | Facility: MEDICAL CENTER | Age: 62
End: 2022-10-25

## 2022-11-01 ENCOUNTER — APPOINTMENT (OUTPATIENT)
Dept: MEDICAL GROUP | Facility: MEDICAL CENTER | Age: 62
End: 2022-11-01

## 2022-11-03 ENCOUNTER — APPOINTMENT (OUTPATIENT)
Dept: MEDICAL GROUP | Facility: MEDICAL CENTER | Age: 62
End: 2022-11-03

## 2022-11-08 ENCOUNTER — APPOINTMENT (OUTPATIENT)
Dept: MEDICAL GROUP | Facility: MEDICAL CENTER | Age: 62
End: 2022-11-08

## 2022-11-09 ENCOUNTER — APPOINTMENT (OUTPATIENT)
Dept: MEDICAL GROUP | Facility: MEDICAL CENTER | Age: 62
End: 2022-11-09

## 2022-11-16 ENCOUNTER — APPOINTMENT (OUTPATIENT)
Dept: MEDICAL GROUP | Facility: MEDICAL CENTER | Age: 62
End: 2022-11-16

## 2022-12-06 ENCOUNTER — TELEMEDICINE (OUTPATIENT)
Dept: MEDICAL GROUP | Facility: MEDICAL CENTER | Age: 62
End: 2022-12-06

## 2022-12-06 VITALS — RESPIRATION RATE: 16 BRPM | WEIGHT: 183 LBS | BODY MASS INDEX: 29.54 KG/M2

## 2022-12-06 DIAGNOSIS — J20.9 ACUTE BRONCHITIS, UNSPECIFIED ORGANISM: ICD-10-CM

## 2022-12-06 DIAGNOSIS — R05.3 PERSISTENT COUGH FOR 3 WEEKS OR LONGER: ICD-10-CM

## 2022-12-06 DIAGNOSIS — R51.9 HEADACHE DUE TO VIRAL INFECTION: ICD-10-CM

## 2022-12-06 DIAGNOSIS — B34.9 HEADACHE DUE TO VIRAL INFECTION: ICD-10-CM

## 2022-12-06 DIAGNOSIS — Z86.16 HISTORY OF 2019 NOVEL CORONAVIRUS DISEASE (COVID-19): ICD-10-CM

## 2022-12-06 DIAGNOSIS — C91.10 CHRONIC LYMPHOCYTIC LEUKEMIA (HCC): ICD-10-CM

## 2022-12-06 PROCEDURE — 99213 OFFICE O/P EST LOW 20 MIN: CPT | Mod: 95 | Performed by: FAMILY MEDICINE

## 2022-12-06 RX ORDER — AZITHROMYCIN 250 MG/1
TABLET, FILM COATED ORAL
Qty: 6 TABLET | Refills: 0 | Status: SHIPPED | OUTPATIENT
Start: 2022-12-06 | End: 2022-12-15 | Stop reason: SDUPTHER

## 2022-12-06 NOTE — LETTER
December 6, 2022        Patient: Trey Fisher   YOB: 1960   Date of Visit: 12/6/2022           To Whom It May Concern:    It is my medical opinion that Trey Fisher is off work from 12/1/2022 through 1/31/2023 due to acute on chronic illness.  Return to work anticipated 2/1/2023.    If you have any questions or concerns, please don't hesitate to call.    Sincerely,      Ivan Melgoza M.D.  Electronically Signed    [place] [address] [place] [id number]  [phone] (Phone)  [phone] (Fax)

## 2022-12-06 NOTE — PROGRESS NOTES
Virtual Visit: Established Patient   This visit was conducted via Zoom  using secure and encrypted videoconferencing technology. The patient was in a private location in the state of [place].    The patient's identity was confirmed and verbal consent was obtained for this virtual visit.      CC: Cough, COVID infection, persistent headache                                                                                                                                        HPI:   Trey presents today with the following.    1. History of 2019 novel coronavirus disease (COVID-19)  Patient has had several verified COVID infections.  The most recent was November 2.  She did go ahead and get her by valent booster November 16.  She says she tolerated it.    2. Persistent cough for 3 weeks or longer  Patient continues to cough.  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  She is trying several over-the-counter remedies.  She continues to use the Phenergan with codeine cough syrup.  This helps at night but when she is trying to work and speak nothing seems to help.  She cannot do her work without speaking.    3. Acute bronchitis, unspecified organism  Patient has a rattling wet cough.  She is bringing up light brown and yellow discolored phlegm.  We will try a Z-Tank but I suspect most of this is postviral.    4. Chronic lymphocytic leukemia (HCC)  Patient does have CLL which impacts her ability to fight infection.  She is following with oncology    5. Headache due to viral infection  Patient has persistent headache ever since she had her last bout of COVID.  This is similar to what she had with her previous bout.  The gabapentin does continue to be helpful.  The headache is sometimes interfering with her ability to work as well.  She has been having to take 2 to 3 days off at a time for the entire last month.  She feels she has worked less than 40%.   She feels like she never gets a chance to actually rest and recuperate.  Her work is suggested that she take time off.    Unable to work persistently, keeps having to take days off.  Does not seem to get well.  Discussed taking time off to get well and recuperate.  Has had all vaccines.      Patient Active Problem List    Diagnosis Date Noted    Persistent neurologic symptoms after COVID-19 05/21/2021    Headache due to viral infection 05/21/2021    Insomnia due to medical condition 05/21/2021    Situational anxiety 05/21/2021    Persistent cough 03/13/2021    Postviral syndrome 03/13/2021    Lab test positive for detection of COVID-19 virus 12/23/2020    History of 2019 novel coronavirus disease (COVID-19) 12/23/2020    Chronic lymphocytic leukemia (HCC) 10/30/2018    Hyperuricemia w/o signs of inflam arthrit and tophaceous dis 10/30/2018       Current Outpatient Medications   Medication Sig Dispense Refill    azithromycin (ZITHROMAX) 250 MG Tab As directed on pack 6 Tablet 0    Promethazine HCl 6.25 MG/5ML Solution Take 5 mL by mouth 3 times a day as needed (cough). 180 mL 2    FLUoxetine (PROZAC) 20 MG Cap Take 1 Capsule by mouth every day. 90 Capsule 2    gabapentin (NEURONTIN) 100 MG Cap Take 3 Capsules by mouth 2 times a day. 540 Capsule 2    triamterene-hydrochlorothiazide (MAXZIDE 75-50) 75-50 MG Tab Take 1 Tablet by mouth every day. 90 Tablet 3    potassium chloride SA (KLOR-CON M20) 20 MEQ Tab CR Take 1 Tablet by mouth every day. 90 Tablet 3    LORazepam (ATIVAN) 0.5 MG Tab Take 1 Tablet by mouth every day for 180 days. 30 tablets is a 30 day quantity 30 Tablet 5    zolpidem (AMBIEN) 10 MG Tab Take 1 Tablet by mouth at bedtime for 180 days. 30 tablets is a 30 day quantity 30 Each 5    ibuprofen (MOTRIN) 600 MG Tab Take 1 Tablet by mouth every 8 hours as needed for Inflammation. 30 Tablet 4    ketoconazole (NIZORAL) 2 % Cream Apply to affected area daily 30 g 2    vitamin D (VITAMIND D3) 1000 UNIT Tab Take  1,000 Units by mouth every day.       No current facility-administered medications for this visit.         Allergies as of 12/06/2022    (No Known Allergies)        ROS:  Denies, chest pain, Shortness of breath, Edema.   Some mild SOB if coughs a great deal    Resp 16 Comment: observed  Wt 83 kg (183 lb) Comment: Pt stated  BMI 29.54 kg/m²       Physical Exam:  Constitutional: Alert, no distress, well-groomed.  Skin: No rashes in visible areas.  Eye: Round. Conjunctiva clear, No icterus.   ENMT: Lips pink without lesions, good dentition, moist mucous membranes. Phonation normal.  Neck: No masses, no thyromegaly. Moves freely without pain.  Respiratory: Unlabored respiratory effort, no cough or audible wheeze  Psych: Alert and oriented x3, normal affect and mood.          Assessment and Plan.   62 y.o. female with the following issues.    1. History of 2019 novel coronavirus disease (COVID-19)  She has now had COVID 3 times that are documented.    2. Persistent cough for 3 weeks or longer/Acute bronchitis, unspecified organism  Trial of azithromycin though I feel most of this is postviral.  - azithromycin (ZITHROMAX) 250 MG Tab; As directed on pack  Dispense: 6 Tablet; Refill: 0    3. Chronic lymphocytic leukemia (HCC)  Continues to follow with oncology.  Makes her very susceptible.    4. Headache due to viral infection  Persistent headache with a great deal of pain.  This is interfering at times with her vision and her ability to do her job.  I believe she needs time off work.  Off work note is written.  I will also complete the rest of her paperwork once I receive it.      Off work note for 12/1/2022 through 1/31/2023 with RTW 2/1/2023  will need to complete her paperwork.  She needs time to recuperate from multiple viral illnesses on top of her leukemia.    Recheck 2 months, sooner as needed

## 2022-12-15 DIAGNOSIS — J20.9 ACUTE BRONCHITIS, UNSPECIFIED ORGANISM: ICD-10-CM

## 2022-12-15 RX ORDER — AZITHROMYCIN 250 MG/1
TABLET, FILM COATED ORAL
Qty: 6 TABLET | Refills: 0 | Status: SHIPPED | OUTPATIENT
Start: 2022-12-15 | End: 2023-03-18

## 2022-12-30 DIAGNOSIS — M19.90 ARTHRITIS: ICD-10-CM

## 2023-01-02 RX ORDER — IBUPROFEN 600 MG/1
600 TABLET ORAL EVERY 8 HOURS PRN
Qty: 30 TABLET | Refills: 4 | Status: SHIPPED | OUTPATIENT
Start: 2023-01-02 | End: 2023-03-14 | Stop reason: SDUPTHER

## 2023-02-09 ENCOUNTER — APPOINTMENT (OUTPATIENT)
Dept: RADIOLOGY | Facility: MEDICAL CENTER | Age: 63
End: 2023-02-09
Attending: FAMILY MEDICINE
Payer: COMMERCIAL

## 2023-03-14 DIAGNOSIS — I10 ESSENTIAL HYPERTENSION: ICD-10-CM

## 2023-03-14 DIAGNOSIS — M19.90 ARTHRITIS: ICD-10-CM

## 2023-03-14 DIAGNOSIS — R29.90 PERSISTENT NEUROLOGIC SYMPTOMS AFTER COVID-19: ICD-10-CM

## 2023-03-14 DIAGNOSIS — F43.23 SITUATIONAL MIXED ANXIETY AND DEPRESSIVE DISORDER: ICD-10-CM

## 2023-03-14 DIAGNOSIS — G93.31 POSTVIRAL SYNDROME: ICD-10-CM

## 2023-03-14 DIAGNOSIS — U09.9 PERSISTENT NEUROLOGIC SYMPTOMS AFTER COVID-19: ICD-10-CM

## 2023-03-14 RX ORDER — GABAPENTIN 100 MG/1
300 CAPSULE ORAL 2 TIMES DAILY
Qty: 540 CAPSULE | Refills: 2 | Status: SHIPPED | OUTPATIENT
Start: 2023-03-14

## 2023-03-14 RX ORDER — FLUOXETINE HYDROCHLORIDE 20 MG/1
20 CAPSULE ORAL DAILY
Qty: 90 CAPSULE | Refills: 2 | Status: SHIPPED | OUTPATIENT
Start: 2023-03-14

## 2023-03-14 RX ORDER — TRIAMTERENE AND HYDROCHLOROTHIAZIDE 75; 50 MG/1; MG/1
1 TABLET ORAL DAILY
Qty: 90 TABLET | Refills: 3 | Status: SHIPPED | OUTPATIENT
Start: 2023-03-14

## 2023-03-14 RX ORDER — IBUPROFEN 600 MG/1
600 TABLET ORAL EVERY 8 HOURS PRN
Qty: 30 TABLET | Refills: 4 | Status: SHIPPED | OUTPATIENT
Start: 2023-03-14

## 2023-03-18 DIAGNOSIS — G47.01 INSOMNIA DUE TO MEDICAL CONDITION: ICD-10-CM

## 2023-03-18 DIAGNOSIS — F41.8 SITUATIONAL ANXIETY: ICD-10-CM

## 2023-03-18 RX ORDER — LORAZEPAM 0.5 MG/1
TABLET ORAL
Qty: 90 TABLET | Refills: 0 | Status: SHIPPED | OUTPATIENT
Start: 2023-03-18 | End: 2023-06-16

## 2023-03-18 RX ORDER — ZOLPIDEM TARTRATE 10 MG/1
TABLET ORAL
Qty: 90 TABLET | Refills: 0 | Status: SHIPPED | OUTPATIENT
Start: 2023-03-18 | End: 2023-06-16

## 2023-03-18 NOTE — TELEPHONE ENCOUNTER
Patient's last visit was December.  She is within the 96-month timeframe for benzodiazepine renewal.  PDMP review shows no inconsistencies.  Ambien and lorazepam prescriptions are renewed.